# Patient Record
Sex: FEMALE | Race: WHITE | NOT HISPANIC OR LATINO | Employment: OTHER | URBAN - METROPOLITAN AREA
[De-identification: names, ages, dates, MRNs, and addresses within clinical notes are randomized per-mention and may not be internally consistent; named-entity substitution may affect disease eponyms.]

---

## 2017-03-23 ENCOUNTER — OFFICE VISIT (OUTPATIENT)
Dept: INTERNAL MEDICINE | Facility: CLINIC | Age: 67
End: 2017-03-23

## 2017-03-23 VITALS
WEIGHT: 165 LBS | BODY MASS INDEX: 28.17 KG/M2 | SYSTOLIC BLOOD PRESSURE: 150 MMHG | RESPIRATION RATE: 16 BRPM | OXYGEN SATURATION: 98 % | HEIGHT: 64 IN | HEART RATE: 91 BPM | DIASTOLIC BLOOD PRESSURE: 82 MMHG

## 2017-03-23 DIAGNOSIS — Z79.899 HIGH RISK MEDICATION USE: ICD-10-CM

## 2017-03-23 DIAGNOSIS — Z87.19 HISTORY OF MELENA: ICD-10-CM

## 2017-03-23 DIAGNOSIS — E78.2 MIXED HYPERLIPIDEMIA: Primary | ICD-10-CM

## 2017-03-23 DIAGNOSIS — Z23 NEED FOR DIPHTHERIA-TETANUS-PERTUSSIS (TDAP) VACCINE: ICD-10-CM

## 2017-03-23 LAB
ALBUMIN SERPL-MCNC: 4.8 G/DL (ref 3.5–5.2)
ALBUMIN/GLOB SERPL: 2.1 G/DL
ALP SERPL-CCNC: 71 U/L (ref 40–129)
ALT SERPL-CCNC: 17 U/L (ref 5–33)
AST SERPL-CCNC: 20 U/L (ref 5–32)
BASOPHILS # BLD AUTO: 0.03 10*3/MM3 (ref 0–0.2)
BASOPHILS NFR BLD AUTO: 0.5 % (ref 0–2)
BILIRUB SERPL-MCNC: 0.8 MG/DL (ref 0.2–1.2)
BUN SERPL-MCNC: 10 MG/DL (ref 8–23)
BUN/CREAT SERPL: 17.9 (ref 7–25)
CALCIUM SERPL-MCNC: 9.9 MG/DL (ref 8.8–10.5)
CHLORIDE SERPL-SCNC: 99 MMOL/L (ref 98–107)
CHOLEST SERPL-MCNC: 171 MG/DL (ref 0–200)
CHOLEST/HDLC SERPL: 3.89 {RATIO}
CO2 SERPL-SCNC: 27.3 MMOL/L (ref 22–29)
CREAT SERPL-MCNC: 0.56 MG/DL (ref 0.57–1)
EOSINOPHIL # BLD AUTO: 0.03 10*3/MM3 (ref 0.1–0.3)
EOSINOPHIL NFR BLD AUTO: 0.5 % (ref 0–4)
ERYTHROCYTE [DISTWIDTH] IN BLOOD BY AUTOMATED COUNT: 13.4 % (ref 11.5–14.5)
GLOBULIN SER CALC-MCNC: 2.3 GM/DL
GLUCOSE SERPL-MCNC: 88 MG/DL (ref 65–99)
HCT VFR BLD AUTO: 43.7 % (ref 37–47)
HDLC SERPL-MCNC: 44 MG/DL (ref 40–60)
HGB BLD-MCNC: 14.3 G/DL (ref 12–16)
IMM GRANULOCYTES # BLD: 0.01 10*3/MM3 (ref 0–0.03)
IMM GRANULOCYTES NFR BLD: 0.2 % (ref 0–0.5)
LDLC SERPL CALC-MCNC: 69 MG/DL (ref 0–100)
LYMPHOCYTES # BLD AUTO: 2.35 10*3/MM3 (ref 0.6–4.8)
LYMPHOCYTES NFR BLD AUTO: 36.4 % (ref 20–45)
MCH RBC QN AUTO: 30.6 PG (ref 27–31)
MCHC RBC AUTO-ENTMCNC: 32.7 G/DL (ref 31–37)
MCV RBC AUTO: 93.4 FL (ref 81–99)
MONOCYTES # BLD AUTO: 0.48 10*3/MM3 (ref 0–1)
MONOCYTES NFR BLD AUTO: 7.4 % (ref 3–8)
NEUTROPHILS # BLD AUTO: 3.55 10*3/MM3 (ref 1.5–8.3)
NEUTROPHILS NFR BLD AUTO: 55 % (ref 45–70)
NRBC BLD AUTO-RTO: 0 /100 WBC (ref 0–0)
PLATELET # BLD AUTO: 336 10*3/MM3 (ref 140–500)
POTASSIUM SERPL-SCNC: 4.4 MMOL/L (ref 3.5–5.2)
PROT SERPL-MCNC: 7.1 G/DL (ref 6–8.5)
RBC # BLD AUTO: 4.68 10*6/MM3 (ref 4.2–5.4)
SODIUM SERPL-SCNC: 139 MMOL/L (ref 136–145)
TRIGL SERPL-MCNC: 292 MG/DL (ref 0–150)
TSH SERPL DL<=0.005 MIU/L-ACNC: 1.05 MIU/ML (ref 0.27–4.2)
VLDLC SERPL CALC-MCNC: 58.4 MG/DL (ref 7–27)
WBC # BLD AUTO: 6.45 10*3/MM3 (ref 4.8–10.8)

## 2017-03-23 PROCEDURE — 99214 OFFICE O/P EST MOD 30 MIN: CPT | Performed by: INTERNAL MEDICINE

## 2017-03-23 RX ORDER — ROSUVASTATIN CALCIUM 10 MG/1
10 TABLET, COATED ORAL DAILY
Qty: 90 TABLET | Refills: 3 | Status: SHIPPED | OUTPATIENT
Start: 2017-03-23 | End: 2018-05-04 | Stop reason: SDUPTHER

## 2017-03-23 NOTE — PATIENT INSTRUCTIONS
Will refer to gastroenterology to evaluate for colonoscopy and esophagogastroduodenoscopy.    Will follow-up with your lab results.   Will plan on mammogram and DEXA in October.    Remember to schedule your dental and eye appointments this year.    Will follow-up with you for your physical.

## 2017-03-23 NOTE — PROGRESS NOTES
Subjective   Freda Castañeda is a 66 y.o. female.     History of Present Illness     The following portions of the patient's history were reviewed and updated as appropriate: allergies, current medications, past family history, past medical history, past social history, past surgical history and problem list.     Freda Castañeda is a 66 y.o. female who presents to [facility].  Needs rf's on crestor (90d, [facility]).      Past Medical History:   Diagnosis Date   • GERD (gastroesophageal reflux disease)    • Hyperlipidemia    • Need for diphtheria-tetanus-pertussis (Tdap) vaccine 3/23/2017   • Seasonal allergies    - arthritis     Current Outpatient Prescriptions on File Prior to Visit   Medication Sig   • acetaminophen (TYLENOL) 650 MG 8 hr tablet Take 2 tablets by mouth.   • ascorbic acid (VITAMIN C) 500 MG tablet Take 500 mg by mouth daily.   • ASPIRIN EC LO-DOSE PO Take 81 mg by mouth daily.   • Cholecalciferol (VITAMIN D3) 2000 UNITS tablet Take 2,000 Units by mouth daily.   • Cyanocobalamin (VITAMIN B 12 PO) Take  by mouth daily.   • diphenhydrAMINE (BENADRYL) 25 MG tablet Take 2 tablets by mouth at night as needed.   • docusate sodium (COLACE) 100 MG capsule Take 300 mg by mouth every night.   • loratadine (CLARITIN) 10 MG tablet Take  by mouth daily.   • mometasone (NASONEX) 50 MCG/ACT nasal spray into each nostril daily.   • Multiple Vitamins-Minerals (CENTRUM SILVER PO) Take  by mouth daily.   • Omega-3 Fatty Acids (FISH OIL) 1000 MG capsule capsule Take  by mouth daily.   • ranitidine (ZANTAC) 150 MG capsule Take 1 capsule by mouth 2 (two) times a day.   • vitamin E 100 UNIT capsule Take  by mouth.   • [DISCONTINUED] rosuvastatin (CRESTOR) 10 MG tablet    • [DISCONTINUED] HYDROcodone-acetaminophen (NORCO) 5-325 MG per tablet Take 1 tablet by mouth every 4 (four) hours as needed for moderate pain (4-6) for up to 15 doses.   • [DISCONTINUED] predniSONE (DELTASONE) 20 MG tablet Take 1 tablet by  mouth 2 (two) times a day.     No current facility-administered medications on file prior to visit.      Past Surgical History:   Procedure Laterality Date   • BURN TREATMENT     • CHOLECYSTECTOMY     • HERNIA REPAIR     • TUBAL ABDOMINAL LIGATION       Family History   Problem Relation Age of Onset   • Breast cancer Sister    - Niece with breast cancer s/p lumpectomy   - Sister with HTN  - Sister #2 with heart disease (MI @72, medically managed), COPD (2nd hand smoke)    SH:  - retired, was a finance assistance  - No TOB use  - ETOH (none)  - No illicits     ALL: NKA    HM:  - C-SCOPE (last was 12 years ago; diverticulosis); deferring currently but admits to sometimes having bloody-diarrhea (last time blood in stool was 2 weeks ago; has hemorrhoids, admits to black and tarry stools)  - Last pap: 2012 (no abnormalities; overdue)  - last mammogram: 2016 (no abnormalities)  - last dexa:unsure when (more than 2 years ago)  - Did not get flu shot this year  - Did not get PNA vaccine series; deferred  - Patient recalls having shingles vaccine in past  - Did not get Tdap in last 10 years    Review of Systems   Constitutional: Negative for activity change, appetite change and fever.   HENT: Negative for ear discharge, ear pain, postnasal drip, rhinorrhea and sore throat.    Eyes: Negative for pain and redness.   Respiratory: Negative for cough, chest tightness, shortness of breath and wheezing.    Cardiovascular: Negative for chest pain, palpitations and leg swelling.   Gastrointestinal: Negative for abdominal pain, blood in stool, constipation, diarrhea, nausea and vomiting.   Endocrine: Negative for cold intolerance and heat intolerance.   Genitourinary: Negative for dysuria, hematuria, vaginal bleeding and vaginal discharge.   Musculoskeletal: Positive for arthralgias. Negative for joint swelling.   Skin: Negative for rash and wound.   Hematological: Negative for adenopathy. Does not bruise/bleed easily.    Psychiatric/Behavioral: Negative for sleep disturbance and suicidal ideas.       Objective   Physical Exam   Constitutional: She is oriented to person, place, and time. She appears well-developed and well-nourished. No distress.   HENT:   Head: Normocephalic and atraumatic.   Right Ear: External ear normal.   Left Ear: External ear normal.   Mouth/Throat: Oropharynx is clear and moist. No oropharyngeal exudate.   Dried blood in right nare   Eyes: Conjunctivae and EOM are normal. Pupils are equal, round, and reactive to light. Right eye exhibits no discharge. Left eye exhibits no discharge. No scleral icterus.   Neck: Normal range of motion. Neck supple. No thyromegaly present.   Cardiovascular: Normal rate, regular rhythm and normal heart sounds.  Exam reveals no gallop and no friction rub.    No murmur heard.  Pulmonary/Chest: Effort normal and breath sounds normal. No respiratory distress. She has no wheezes. She has no rales. She exhibits no tenderness.   Abdominal: Soft. Bowel sounds are normal. She exhibits no distension and no mass. There is no tenderness. There is no rebound and no guarding. No hernia.   Musculoskeletal: Normal range of motion. She exhibits no tenderness.   Lymphadenopathy:     She has no cervical adenopathy.   Neurological: She is alert and oriented to person, place, and time. She exhibits normal muscle tone.   Skin: Skin is warm and dry. No rash noted. She is not diaphoretic.   Psychiatric: She has a normal mood and affect. Her behavior is normal.   Nursing note and vitals reviewed.      Assessment/Plan   Diagnoses and all orders for this visit:    Mixed hyperlipidemia  -     rosuvastatin (CRESTOR) 10 MG tablet; Take 1 tablet by mouth Daily.  -     Comprehensive metabolic panel  -     Lipid Panel w/ Chol/HDL Ratio  -     TSH  -     CBC w AUTO Differential    History of melena  -     Ambulatory Referral to Gastroenterology  -     CBC w AUTO Differential    High risk medication use    Need  for diphtheria-tetanus-pertussis (Tdap) vaccine      Freda Castañeda is a 66 y.o. female with PMH of GERD, HLD, and arthritis.      HLD:  - Recommend diet/exercise  - rf crestor     Allergies:  - Con't claritin  - Con't nasonex  - Add nasal saline to regimen for bloody nose     HM:  - Deferring PNA vaccine   - Will get vaccination records to assess when shingles vaccine was given  - Will give Tdap this visit as she has grandchildren   - Will schedule PAP on PE f/u  - Will check CBC, CMP, HbA1c, FLP, TSH, Vit D   - Will arrange DEXA scan and mammogram at the same time  - Pt deferring c-scope till the fall for her blood in stool; may need egd for melena.  Will refer to Dr. Westbrook for evaluation.      Gomez Andrews MD  Resident provider   PGY-3  IM/Ped    Patient seen and examined with resident physician - agree with assessment and plan. Tdap not covered so deferred today.   Refilled medication today.

## 2017-03-27 ENCOUNTER — TELEPHONE (OUTPATIENT)
Dept: INTERNAL MEDICINE | Facility: CLINIC | Age: 67
End: 2017-03-27

## 2017-03-27 NOTE — TELEPHONE ENCOUNTER
Patient v/u.    ----- Message from Krishan Robles MD sent at 3/23/2017  9:06 PM EDT -----  Labs look really good. Her trigycerides are very high, need fish oil ig twice daily or krill daiy.  The thyroid is good.

## 2017-11-06 DIAGNOSIS — Z00.00 HEALTHCARE MAINTENANCE: ICD-10-CM

## 2017-11-06 DIAGNOSIS — E78.5 HYPERLIPIDEMIA, UNSPECIFIED HYPERLIPIDEMIA TYPE: Primary | ICD-10-CM

## 2017-11-06 LAB
ALBUMIN SERPL-MCNC: 4.5 G/DL (ref 3.5–5.2)
ALBUMIN/GLOB SERPL: 2.1 G/DL
ALP SERPL-CCNC: 63 U/L (ref 40–129)
ALT SERPL-CCNC: 13 U/L (ref 5–33)
AST SERPL-CCNC: 17 U/L (ref 5–32)
BASOPHILS # BLD AUTO: 0.03 10*3/MM3 (ref 0–0.2)
BASOPHILS NFR BLD AUTO: 0.6 % (ref 0–2)
BILIRUB SERPL-MCNC: 0.9 MG/DL (ref 0.2–1.2)
BUN SERPL-MCNC: 8 MG/DL (ref 8–23)
BUN/CREAT SERPL: 16 (ref 7–25)
CALCIUM SERPL-MCNC: 9.5 MG/DL (ref 8.8–10.5)
CHLORIDE SERPL-SCNC: 103 MMOL/L (ref 98–107)
CHOLEST SERPL-MCNC: 162 MG/DL (ref 0–200)
CHOLEST/HDLC SERPL: 3.68 {RATIO}
CO2 SERPL-SCNC: 25.9 MMOL/L (ref 22–29)
CREAT SERPL-MCNC: 0.5 MG/DL (ref 0.57–1)
EOSINOPHIL # BLD AUTO: 0.04 10*3/MM3 (ref 0.1–0.3)
EOSINOPHIL NFR BLD AUTO: 0.8 % (ref 0–4)
ERYTHROCYTE [DISTWIDTH] IN BLOOD BY AUTOMATED COUNT: 13.4 % (ref 11.5–14.5)
GFR SERPLBLD CREATININE-BSD FMLA CKD-EPI: 123 ML/MIN/1.73
GFR SERPLBLD CREATININE-BSD FMLA CKD-EPI: 150 ML/MIN/1.73
GLOBULIN SER CALC-MCNC: 2.1 GM/DL
GLUCOSE SERPL-MCNC: 97 MG/DL (ref 65–99)
HCT VFR BLD AUTO: 40.6 % (ref 37–47)
HDLC SERPL-MCNC: 44 MG/DL (ref 40–60)
HGB BLD-MCNC: 13.7 G/DL (ref 12–16)
IMM GRANULOCYTES # BLD: 0.01 10*3/MM3 (ref 0–0.03)
IMM GRANULOCYTES NFR BLD: 0.2 % (ref 0–0.5)
LDLC SERPL CALC-MCNC: 79 MG/DL (ref 0–100)
LYMPHOCYTES # BLD AUTO: 1.89 10*3/MM3 (ref 0.6–4.8)
LYMPHOCYTES NFR BLD AUTO: 39.8 % (ref 20–45)
MCH RBC QN AUTO: 32 PG (ref 27–31)
MCHC RBC AUTO-ENTMCNC: 33.7 G/DL (ref 31–37)
MCV RBC AUTO: 94.9 FL (ref 81–99)
MONOCYTES # BLD AUTO: 0.47 10*3/MM3 (ref 0–1)
MONOCYTES NFR BLD AUTO: 9.9 % (ref 3–8)
NEUTROPHILS # BLD AUTO: 2.31 10*3/MM3 (ref 1.5–8.3)
NEUTROPHILS NFR BLD AUTO: 48.7 % (ref 45–70)
NRBC BLD AUTO-RTO: 0 /100 WBC (ref 0–0)
PLATELET # BLD AUTO: 295 10*3/MM3 (ref 140–500)
POTASSIUM SERPL-SCNC: 5 MMOL/L (ref 3.5–5.2)
PROT SERPL-MCNC: 6.6 G/DL (ref 6–8.5)
RBC # BLD AUTO: 4.28 10*6/MM3 (ref 4.2–5.4)
SODIUM SERPL-SCNC: 141 MMOL/L (ref 136–145)
TRIGL SERPL-MCNC: 195 MG/DL (ref 0–150)
TSH SERPL DL<=0.005 MIU/L-ACNC: 0.94 MIU/ML (ref 0.27–4.2)
VLDLC SERPL CALC-MCNC: 39 MG/DL (ref 7–27)
WBC # BLD AUTO: 4.75 10*3/MM3 (ref 4.8–10.8)

## 2017-11-14 ENCOUNTER — OFFICE VISIT (OUTPATIENT)
Dept: INTERNAL MEDICINE | Facility: CLINIC | Age: 67
End: 2017-11-14

## 2017-11-14 VITALS
DIASTOLIC BLOOD PRESSURE: 70 MMHG | WEIGHT: 164 LBS | OXYGEN SATURATION: 97 % | RESPIRATION RATE: 16 BRPM | HEIGHT: 64 IN | SYSTOLIC BLOOD PRESSURE: 136 MMHG | HEART RATE: 97 BPM | BODY MASS INDEX: 28 KG/M2

## 2017-11-14 DIAGNOSIS — Z12.31 ENCOUNTER FOR SCREENING MAMMOGRAM FOR MALIGNANT NEOPLASM OF BREAST: ICD-10-CM

## 2017-11-14 DIAGNOSIS — E78.2 MIXED HYPERLIPIDEMIA: Primary | ICD-10-CM

## 2017-11-14 DIAGNOSIS — Z23 NEED FOR PNEUMOCOCCAL VACCINATION: ICD-10-CM

## 2017-11-14 DIAGNOSIS — M19.90 ARTHRITIS: ICD-10-CM

## 2017-11-14 DIAGNOSIS — K21.9 GASTROESOPHAGEAL REFLUX DISEASE, ESOPHAGITIS PRESENCE NOT SPECIFIED: ICD-10-CM

## 2017-11-14 DIAGNOSIS — Z78.0 MENOPAUSE: ICD-10-CM

## 2017-11-14 DIAGNOSIS — R19.7 DIARRHEA, UNSPECIFIED TYPE: ICD-10-CM

## 2017-11-14 DIAGNOSIS — Z12.4 PAP SMEAR FOR CERVICAL CANCER SCREENING: ICD-10-CM

## 2017-11-14 PROCEDURE — 90670 PCV13 VACCINE IM: CPT | Performed by: INTERNAL MEDICINE

## 2017-11-14 PROCEDURE — G0009 ADMIN PNEUMOCOCCAL VACCINE: HCPCS | Performed by: INTERNAL MEDICINE

## 2017-11-14 PROCEDURE — G0438 PPPS, INITIAL VISIT: HCPCS | Performed by: INTERNAL MEDICINE

## 2017-11-14 PROCEDURE — 99213 OFFICE O/P EST LOW 20 MIN: CPT | Performed by: INTERNAL MEDICINE

## 2017-11-14 NOTE — PROGRESS NOTES
QUICK REFERENCE INFORMATION:  The ABCs of the Annual Wellness Visit    Initial Medicare Wellness Visit    HEALTH RISK ASSESSMENT    1950    Recent Hospitalizations:  No hospitalization(s) within the last year..        Current Medical Providers:  Patient Care Team:  Haresh Westbrook MD as PCP - General (Gastroenterology)  Krishan Robles MD as PCP - Claims Attributed      Smoking Status:  History   Smoking Status   • Never Smoker   Smokeless Tobacco   • Not on file       Alcohol Consumption:  History   Alcohol Use No       Depression Screen:   PHQ-2/PHQ-9 Depression Screening 11/14/2017   Little interest or pleasure in doing things 0   Feeling down, depressed, or hopeless 0   Total Score 0       Health Habits and Functional and Cognitive Screening:  Functional & Cognitive Status 11/14/2017   Do you have difficulty preparing food and eating? No   Do you have difficulty bathing yourself, getting dressed or grooming yourself? No   Do you have difficulty using the toilet? No   Do you have difficulty moving around from place to place? No   Do you have trouble with steps or getting out of a bed or a chair? No   In the past year have you fallen or experienced a near fall? No   Do you need help using the phone?  No   Are you deaf or do you have serious difficulty hearing?  No   Do you need help with transportation? No   Do you need help shopping? No   Do you need help preparing meals?  No   Do you need help with housework?  No   Do you need help with laundry? No   Do you need help taking your medications? No   Do you need help managing money? No   Do you have difficulty concentrating, remembering or making decisions? No           Does the patient have evidence of cognitive impairment? No    Asiprin use counseling: Taking ASA appropriately as indicated      Recent Lab Results:    Visual Acuity:  No exam data present    Age-appropriate Screening Schedule:  Refer to the list below for future screening  recommendations based on patient's age, sex and/or medical conditions. Orders for these recommended tests are listed in the plan section. The patient has been provided with a written plan.    Health Maintenance   Topic Date Due   • COLONOSCOPY  07/05/2016   • PNEUMOCOCCAL VACCINES (65+ LOW/MEDIUM RISK) (1 of 2 - PCV13) 10/12/2016   • MAMMOGRAM  10/14/2018   • LIPID PANEL  11/06/2018   • TDAP/TD VACCINES (2 - Td) 03/23/2027   • INFLUENZA VACCINE  Addressed   • ZOSTER VACCINE  Addressed        Subjective   History of Present Illness    Freda Castañeda is a 67 y.o. female who presents for an Annual Wellness Visit.    Eating well with some home cooked meals.  Protein drink in the morning . No sugar sweetened drinks.  Some sweets and a chocaholic. Trying to walk every day about 45 min to 1 hour.  Minimal sweating with this time. Some breads and potatoes.      She is very tearful today about her grandson, son of her son, who is reportedly transgender.  She has not seen her family since April. They moved to [place] last April. She is very upset about the whole situation and just wants a relationship with her grandchildren.  She is [employer]. She reports discord.       She is having intermittent non painful scant red blood per rectum. NO pain with defecation.  Did have prior colonoscopy but overdue.  She has history of normal colonoscopy. NO change in bowel patterns.      She does think she may have more constipation with generic crestor.  She is hoping to transition back.       The following portions of the patient's history were reviewed and updated as appropriate: allergies, current medications, past family history, past medical history, past social history, past surgical history and problem list.    Outpatient Medications Prior to Visit   Medication Sig Dispense Refill   • acetaminophen (TYLENOL) 650 MG 8 hr tablet Take 2 tablets by mouth.     • ascorbic acid (VITAMIN C) 500 MG tablet Take 500 mg by mouth daily.     •  ASPIRIN EC LO-DOSE PO Take 81 mg by mouth daily.     • Cholecalciferol (VITAMIN D3) 2000 UNITS tablet Take 2,000 Units by mouth daily.     • Cyanocobalamin (VITAMIN B 12 PO) Take  by mouth daily.     • diphenhydrAMINE (BENADRYL) 25 MG tablet Take 2 tablets by mouth at night as needed.     • docusate sodium (COLACE) 100 MG capsule Take 300 mg by mouth every night.     • loratadine (CLARITIN) 10 MG tablet Take  by mouth daily.     • mometasone (NASONEX) 50 MCG/ACT nasal spray into each nostril daily.     • Multiple Vitamins-Minerals (CENTRUM SILVER PO) Take  by mouth daily.     • Omega-3 Fatty Acids (FISH OIL) 1000 MG capsule capsule Take  by mouth daily.     • ranitidine (ZANTAC) 150 MG capsule Take 1 capsule by mouth 2 (two) times a day. 30 capsule 0   • rosuvastatin (CRESTOR) 10 MG tablet Take 1 tablet by mouth Daily. 90 tablet 3   • vitamin E 100 UNIT capsule Take  by mouth.       No facility-administered medications prior to visit.        Patient Active Problem List   Diagnosis   • Hay fever   • Arthritis   • Hematochezia   • Diarrhea   • Gastroesophageal reflux disease   • Hyperlipidemia   • Polyarteritis nodosa   • Hordeolum externum   • Arthritis of temporomandibular joint   • Urinary tract infection   • Need for diphtheria-tetanus-pertussis (Tdap) vaccine   • Menopause       Advance Care Planning:  has NO advance directive - not interested in additional information    Identification of Risk Factors:  Risk factors include: weight  and unhealthy diet.    Review of Systems   Constitutional: Negative.  Negative for fatigue and fever.   HENT: Negative.    Respiratory: Negative.    Cardiovascular: Negative.    Gastrointestinal: Negative.    Musculoskeletal: Negative.    Neurological: Negative.  Negative for tremors, speech difficulty and headaches.   Psychiatric/Behavioral: Positive for sleep disturbance. The patient is nervous/anxious.        Compared to one year ago, the patient feels her physical health is the  "same.  Compared to one year ago, the patient feels her mental health is the same.    Objective     Physical Exam   Constitutional: She is oriented to person, place, and time. She appears well-developed and well-nourished.   HENT:   Head: Normocephalic and atraumatic.   Right Ear: External ear normal.   Left Ear: External ear normal.   Eyes: EOM are normal. Pupils are equal, round, and reactive to light.   Neck: Normal range of motion. Neck supple. No thyromegaly present.   Cardiovascular: Normal rate, regular rhythm and normal heart sounds.    No murmur heard.  Pulmonary/Chest: Effort normal and breath sounds normal.   Musculoskeletal: She exhibits no edema.   Neurological: She is alert and oriented to person, place, and time.   Skin: Skin is warm and dry.   Psychiatric: She has a normal mood and affect. Her behavior is normal.   Vitals reviewed.      Vitals:    11/14/17 1351   BP: 136/70   Pulse: 97   Resp: 16   SpO2: 97%   Weight: 164 lb (74.4 kg)   Height: 64\" (162.6 cm)       Body mass index is 28.15 kg/(m^2).  Discussed the patient's BMI with her. The BMI is above average; BMI management plan is completed.    Assessment/Plan   Patient Self-Management and Personalized Health Advice  The patient has been provided with information about: diet, exercise and weight management and preventive services including:   ·  is NOK.    Visit Diagnoses:    ICD-10-CM ICD-9-CM   1. Mixed hyperlipidemia E78.2 272.2   2. Diarrhea, unspecified type R19.7 787.91   3. Gastroesophageal reflux disease, esophagitis presence not specified K21.9 530.81   4. Arthritis M19.90 716.90   5. Pap smear for cervical cancer screening Z12.4 V76.2   6. Menopause Z78.0 627.2   7. Encounter for screening mammogram for malignant neoplasm of breast  Z12.31 V76.12       Orders Placed This Encounter   Procedures   • Mammo Screening Bilateral With CAD     Standing Status:   Future     Standing Expiration Date:   11/14/2018     Order Specific " Question:   Reason for Exam:     Answer:   menopause   • DEXA Bone Density Axial     Order Specific Question:   Reason for Exam:     Answer:   menopause       Outpatient Encounter Prescriptions as of 11/14/2017   Medication Sig Dispense Refill   • acetaminophen (TYLENOL) 650 MG 8 hr tablet Take 2 tablets by mouth.     • ascorbic acid (VITAMIN C) 500 MG tablet Take 500 mg by mouth daily.     • ASPIRIN EC LO-DOSE PO Take 81 mg by mouth daily.     • Cholecalciferol (VITAMIN D3) 2000 UNITS tablet Take 2,000 Units by mouth daily.     • Cyanocobalamin (VITAMIN B 12 PO) Take  by mouth daily.     • diphenhydrAMINE (BENADRYL) 25 MG tablet Take 2 tablets by mouth at night as needed.     • docusate sodium (COLACE) 100 MG capsule Take 300 mg by mouth every night.     • loratadine (CLARITIN) 10 MG tablet Take  by mouth daily.     • mometasone (NASONEX) 50 MCG/ACT nasal spray into each nostril daily.     • Multiple Vitamins-Minerals (CENTRUM SILVER PO) Take  by mouth daily.     • Omega-3 Fatty Acids (FISH OIL) 1000 MG capsule capsule Take  by mouth daily.     • ranitidine (ZANTAC) 150 MG capsule Take 1 capsule by mouth 2 (two) times a day. 30 capsule 0   • rosuvastatin (CRESTOR) 10 MG tablet Take 1 tablet by mouth Daily. 90 tablet 3   • vitamin E 100 UNIT capsule Take  by mouth.       No facility-administered encounter medications on file as of 11/14/2017.        Reviewed use of high risk medication in the elderly: yes  Reviewed for potential of harmful drug interactions in the elderly: yes    Follow Up:  Return in about 6 months (around 5/14/2018).     An After Visit Summary and PPPS with all of these plans were given to the patient.      HCM  - Prevnar today  - TDAP at last visit  - Shingles already given previously  - Will schedule DEXA and Mammogram   - Will need PAP at next appointment.      Arthralgias  - Will refill diclofenac PRN for pain  - Already taking tylenol arthritis strength.     Hematochezia  - Periodic  hematochezia and last C scope aprox 13 years ago  - Will re-schedule appointment with GI for C scope  - Denies melena today.  Not on NSAIDS.  Likely will not need EGD as only taking ranitidine PRN.     HLD  - Will transition back to Crestor (from rosuvastatin with calcium) due to constipation  - Lipid panel well controlled.       Jose Rouse MD  Med/Peds PGY-3         Patient seen and examined with resident physician, agree with assessment and plan. She was very tearful, offered SSRI therapy or otc 5htp, but she declines.    Spent 25 min in discussion with her. Recommended counseling due to family discord that is definitely adversely affecting her quality of life.

## 2017-12-14 ENCOUNTER — HOSPITAL ENCOUNTER (OUTPATIENT)
Dept: MAMMOGRAPHY | Facility: HOSPITAL | Age: 67
Discharge: HOME | End: 2017-12-14
Attending: INTERNAL MEDICINE | Admitting: INTERNAL MEDICINE

## 2017-12-14 ENCOUNTER — APPOINTMENT (OUTPATIENT)
Dept: BONE DENSITY | Facility: HOSPITAL | Age: 67
End: 2017-12-14
Attending: INTERNAL MEDICINE

## 2017-12-14 DIAGNOSIS — Z12.31 ENCOUNTER FOR SCREENING MAMMOGRAM FOR MALIGNANT NEOPLASM OF BREAST: ICD-10-CM

## 2017-12-14 DIAGNOSIS — Z78.0 MENOPAUSE: ICD-10-CM

## 2017-12-14 PROCEDURE — 77063 BREAST TOMOSYNTHESIS BI: CPT

## 2017-12-14 PROCEDURE — 77080 DXA BONE DENSITY AXIAL: CPT

## 2017-12-14 PROCEDURE — G0202 SCR MAMMO BI INCL CAD: HCPCS

## 2017-12-18 ENCOUNTER — TELEPHONE (OUTPATIENT)
Dept: INTERNAL MEDICINE | Facility: CLINIC | Age: 67
End: 2017-12-18

## 2017-12-18 NOTE — TELEPHONE ENCOUNTER
Patient is aware        ----- Message from Krishan Robles MD sent at 12/14/2017  4:53 PM EST -----  Mild osteopenia, calcium and vitamin D

## 2017-12-18 NOTE — TELEPHONE ENCOUNTER
Patient is aware      ----- Message from Krishan Robles MD sent at 12/14/2017 12:13 PM EST -----  Mammogram negative

## 2018-05-04 DIAGNOSIS — E78.2 MIXED HYPERLIPIDEMIA: ICD-10-CM

## 2018-05-04 RX ORDER — ROSUVASTATIN CALCIUM 10 MG/1
10 TABLET, COATED ORAL DAILY
Qty: 90 TABLET | Refills: 1 | Status: SHIPPED | OUTPATIENT
Start: 2018-05-04 | End: 2018-10-30 | Stop reason: SDUPTHER

## 2018-09-06 ENCOUNTER — OFFICE VISIT (OUTPATIENT)
Dept: GASTROENTEROLOGY | Facility: CLINIC | Age: 68
End: 2018-09-06

## 2018-09-06 VITALS
HEIGHT: 64 IN | SYSTOLIC BLOOD PRESSURE: 118 MMHG | WEIGHT: 162.2 LBS | BODY MASS INDEX: 27.69 KG/M2 | DIASTOLIC BLOOD PRESSURE: 78 MMHG

## 2018-09-06 DIAGNOSIS — Z12.11 SCREENING FOR COLON CANCER: ICD-10-CM

## 2018-09-06 DIAGNOSIS — R10.84 GENERALIZED ABDOMINAL PAIN: Primary | ICD-10-CM

## 2018-09-06 DIAGNOSIS — K21.9 GASTROESOPHAGEAL REFLUX DISEASE, ESOPHAGITIS PRESENCE NOT SPECIFIED: ICD-10-CM

## 2018-09-06 DIAGNOSIS — K92.1 HEMATOCHEZIA: ICD-10-CM

## 2018-09-06 PROCEDURE — 99214 OFFICE O/P EST MOD 30 MIN: CPT | Performed by: INTERNAL MEDICINE

## 2018-09-06 NOTE — PROGRESS NOTES
PATIENT INFORMATION  Freda Castañeda       - 1950    CHIEF COMPLAINT  Chief Complaint   Patient presents with   • Constipation   • Diarrhea   • Heartburn       HISTORY OF PRESENT ILLNESS  Is 14 years past her last screening but has had loose BMs that are worst but is moving bowels daily and has developed worseing Hemorrhoids awith intermittant BRBPR and no Anemia and that was a year ago. No direct family history oand her last 2 colonoscopy were normal  Also goes on to describe heartburn unresponsive to tagamet and has tried several different OTC meds. No meatr impaction nor dysphagia. NOcturnal chest burning, and has had an ER visit for CP. W/U was negative in 2016. Also has had overal less constipation since her GB was out 6 years ago              REVIEW OF SYSTEMS  Review of Systems   Constitutional: Positive for fatigue.   Gastrointestinal: Positive for abdominal pain, constipation, diarrhea, nausea and rectal pain.        Reflux   All other systems reviewed and are negative.        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]   • Urinary tract infection [N39.0]         PAST MEDICAL HISTORY  Past Medical History:   Diagnosis Date   • GERD (gastroesophageal reflux disease)    • Hyperlipidemia    • Menopause 2017   • Need for diphtheria-tetanus-pertussis (Tdap) vaccine 3/23/2017   • Pap smear for cervical cancer screening 2017   • Seasonal allergies          SURGICAL HISTORY  Past Surgical History:   Procedure Laterality Date   • BURN TREATMENT     • CHOLECYSTECTOMY     • COLONOSCOPY     • HERNIA REPAIR     •  TUBAL ABDOMINAL LIGATION           FAMILY HISTORY  Family History   Problem Relation Age of Onset   • Breast cancer Sister    • Breast cancer Other    • Colon polyps Neg Hx    • Crohn's disease Neg Hx          SOCIAL HISTORY  Social History     Occupational History   • Not on file.     Social History Main Topics   • Smoking status: Never Smoker   • Smokeless tobacco: Not on file   • Alcohol use No   • Drug use: No   • Sexual activity: Yes         CURRENT MEDICATIONS    Current Outpatient Prescriptions:   •  acetaminophen (TYLENOL) 650 MG 8 hr tablet, Take 2 tablets by mouth., Disp: , Rfl:   •  ascorbic acid (VITAMIN C) 500 MG tablet, Take 500 mg by mouth daily., Disp: , Rfl:   •  ASPIRIN EC LO-DOSE PO, Take 81 mg by mouth daily., Disp: , Rfl:   •  Cholecalciferol (VITAMIN D3) 2000 UNITS tablet, Take 2,000 Units by mouth daily., Disp: , Rfl:   •  Cyanocobalamin (VITAMIN B 12 PO), Take  by mouth daily., Disp: , Rfl:   •  diphenhydrAMINE (BENADRYL) 25 MG tablet, Take 2 tablets by mouth at night as needed., Disp: , Rfl:   •  docusate sodium (COLACE) 100 MG capsule, Take 300 mg by mouth every night., Disp: , Rfl:   •  loratadine (CLARITIN) 10 MG tablet, Take  by mouth daily., Disp: , Rfl:   •  mometasone (NASONEX) 50 MCG/ACT nasal spray, into each nostril daily., Disp: , Rfl:   •  Multiple Vitamins-Minerals (CENTRUM SILVER PO), Take  by mouth daily., Disp: , Rfl:   •  Omega-3 Fatty Acids (FISH OIL) 1000 MG capsule capsule, Take  by mouth daily., Disp: , Rfl:   •  ranitidine (ZANTAC) 150 MG capsule, Take 1 capsule by mouth 2 (two) times a day., Disp: 30 capsule, Rfl: 0  •  rosuvastatin (CRESTOR) 10 MG tablet, Take 1 tablet by mouth Daily., Disp: 90 tablet, Rfl: 1  •  sodium phosphates (OSMOPREP) 1.102-0.398 g tablet tablet, Starting at 3:00pm -4 tabs every 15 min for 5 doses (20 tabs), second dosing starting at 11:00pm 4 tabs every 15 min. For 2 doses (8 tabs), Disp: 28 tablet, Rfl: 0  •  vitamin E 100 UNIT capsule, Take   "by mouth., Disp: , Rfl:     ALLERGIES  Other    VITALS  Vitals:    09/06/18 1126   BP: 118/78   Weight: 73.6 kg (162 lb 3.2 oz)   Height: 162.6 cm (64.02\")       LAST RESULTS   Orders Only on 11/06/2017   Component Date Value Ref Range Status   • WBC 11/06/2017 4.75* 4.80 - 10.80 10*3/mm3 Final   • RBC 11/06/2017 4.28  4.20 - 5.40 10*6/mm3 Final   • Hemoglobin 11/06/2017 13.7  12.0 - 16.0 g/dL Final   • Hematocrit 11/06/2017 40.6  37.0 - 47.0 % Final   • MCV 11/06/2017 94.9  81.0 - 99.0 fL Final   • MCH 11/06/2017 32.0* 27.0 - 31.0 pg Final   • MCHC 11/06/2017 33.7  31.0 - 37.0 g/dL Final   • RDW 11/06/2017 13.4  11.5 - 14.5 % Final   • Platelets 11/06/2017 295  140 - 500 10*3/mm3 Final   • Neutrophil Rel % 11/06/2017 48.7  45.0 - 70.0 % Final   • Lymphocyte Rel % 11/06/2017 39.8  20.0 - 45.0 % Final   • Monocyte Rel % 11/06/2017 9.9* 3.0 - 8.0 % Final   • Eosinophil Rel % 11/06/2017 0.8  0.0 - 4.0 % Final   • Basophil Rel % 11/06/2017 0.6  0.0 - 2.0 % Final   • Neutrophils Absolute 11/06/2017 2.31  1.50 - 8.30 10*3/mm3 Final   • Lymphocytes Absolute 11/06/2017 1.89  0.60 - 4.80 10*3/mm3 Final   • Monocytes Absolute 11/06/2017 0.47  0.00 - 1.00 10*3/mm3 Final   • Eosinophils Absolute 11/06/2017 0.04* 0.10 - 0.30 10*3/mm3 Final   • Basophils Absolute 11/06/2017 0.03  0.00 - 0.20 10*3/mm3 Final   • Immature Granulocyte Rel % 11/06/2017 0.2  0.0 - 0.5 % Final   • Immature Grans Absolute 11/06/2017 0.01  0.00 - 0.03 10*3/mm3 Final   • nRBC 11/06/2017 0.0  0.0 - 0.0 /100 WBC Final   • Glucose 11/06/2017 97  65 - 99 mg/dL Final   • BUN 11/06/2017 8  8 - 23 mg/dL Final   • Creatinine 11/06/2017 0.50* 0.57 - 1.00 mg/dL Final   • eGFR Non African Am 11/06/2017 123  >60 mL/min/1.73 Final   • eGFR African Am 11/06/2017 150  >60 mL/min/1.73 Final   • BUN/Creatinine Ratio 11/06/2017 16.0  7.0 - 25.0 Final   • Sodium 11/06/2017 141  136 - 145 mmol/L Final   • Potassium 11/06/2017 5.0  3.5 - 5.2 mmol/L Final   • Chloride " 11/06/2017 103  98 - 107 mmol/L Final   • Total CO2 11/06/2017 25.9  22.0 - 29.0 mmol/L Final   • Calcium 11/06/2017 9.5  8.8 - 10.5 mg/dL Final   • Total Protein 11/06/2017 6.6  6.0 - 8.5 g/dL Final   • Albumin 11/06/2017 4.50  3.50 - 5.20 g/dL Final   • Globulin 11/06/2017 2.1  gm/dL Final   • A/G Ratio 11/06/2017 2.1  g/dL Final   • Total Bilirubin 11/06/2017 0.9  0.2 - 1.2 mg/dL Final   • Alkaline Phosphatase 11/06/2017 63  40 - 129 U/L Final   • AST (SGOT) 11/06/2017 17  5 - 32 U/L Final   • ALT (SGPT) 11/06/2017 13  5 - 33 U/L Final   • Total Cholesterol 11/06/2017 162  0 - 200 mg/dL Final   • Triglycerides 11/06/2017 195* 0 - 150 mg/dL Final   • HDL Cholesterol 11/06/2017 44  40 - 60 mg/dL Final   • VLDL Cholesterol 11/06/2017 39* 7 - 27 mg/dL Final   • LDL Cholesterol  11/06/2017 79  0 - 100 mg/dL Final   • Chol/HDL Ratio 11/06/2017 3.68   Final   • TSH 11/06/2017 0.935  0.270 - 4.200 mIU/mL Final     No results found.    PHYSICAL EXAM  Physical Exam   Constitutional: She is oriented to person, place, and time. She appears well-developed and well-nourished.   HENT:   Head: Normocephalic and atraumatic.   Eyes: Pupils are equal, round, and reactive to light. Conjunctivae and EOM are normal. No scleral icterus.   Neck: Normal range of motion. Neck supple. No thyromegaly present.   Cardiovascular: Normal rate, regular rhythm, normal heart sounds and intact distal pulses.  Exam reveals no gallop.    No murmur heard.  Pulmonary/Chest: Effort normal and breath sounds normal. She has no wheezes. She has no rales.   Abdominal: Soft. Bowel sounds are normal. She exhibits no shifting dullness, no distension, no fluid wave, no abdominal bruit, no ascites and no mass. There is no hepatosplenomegaly. There is tenderness in the right upper quadrant, right lower quadrant, epigastric area, periumbilical area, left upper quadrant and left lower quadrant. There is no guarding and negative Ewing's sign. Hernia confirmed  negative in the ventral area.   Musculoskeletal: Normal range of motion. She exhibits no edema.   Lymphadenopathy:     She has no cervical adenopathy.   Neurological: She is alert and oriented to person, place, and time.   Skin: Skin is warm and dry. No rash noted. She is not diaphoretic. No erythema.   Psychiatric: She has a normal mood and affect. Her behavior is normal.       ASSESSMENT  Diagnoses and all orders for this visit:    Generalized abdominal pain    Gastroesophageal reflux disease, esophagitis presence not specified  -     Case Request; Standing  -     Case Request    Hematochezia    Screening for colon cancer  -     Case Request; Standing  -     Case Request    Other orders  -     Follow Anesthesia Guidelines / Standing Orders; Future  -     Obtain Informed Consent; Standing  -     sodium phosphates (OSMOPREP) 1.102-0.398 g tablet tablet; Starting at 3:00pm -4 tabs every 15 min for 5 doses (20 tabs), second dosing starting at 11:00pm 4 tabs every 15 min. For 2 doses (8 tabs)          PLAN  Return in about 3 months (around 12/6/2018).

## 2018-10-30 DIAGNOSIS — E78.2 MIXED HYPERLIPIDEMIA: ICD-10-CM

## 2018-10-30 RX ORDER — ROSUVASTATIN CALCIUM 10 MG/1
TABLET, COATED ORAL
Qty: 90 TABLET | Refills: 1 | Status: SHIPPED | OUTPATIENT
Start: 2018-10-30 | End: 2019-04-29 | Stop reason: SDUPTHER

## 2018-11-02 ENCOUNTER — HOSPITAL ENCOUNTER (OUTPATIENT)
Facility: HOSPITAL | Age: 68
Setting detail: HOSPITAL OUTPATIENT SURGERY
Discharge: HOME | End: 2018-11-02
Attending: INTERNAL MEDICINE | Admitting: INTERNAL MEDICINE

## 2018-11-02 ENCOUNTER — ANESTHESIA EVENT (OUTPATIENT)
Dept: GASTROENTEROLOGY | Facility: HOSPITAL | Age: 68
End: 2018-11-02

## 2018-11-02 ENCOUNTER — ANESTHESIA (OUTPATIENT)
Dept: GASTROENTEROLOGY | Facility: HOSPITAL | Age: 68
End: 2018-11-02

## 2018-11-02 VITALS
TEMPERATURE: 97.8 F | HEIGHT: 64 IN | HEART RATE: 85 BPM | OXYGEN SATURATION: 98 % | DIASTOLIC BLOOD PRESSURE: 63 MMHG | RESPIRATION RATE: 16 BRPM | BODY MASS INDEX: 27.33 KG/M2 | SYSTOLIC BLOOD PRESSURE: 121 MMHG | WEIGHT: 160.06 LBS

## 2018-11-02 DIAGNOSIS — K21.9 GASTROESOPHAGEAL REFLUX DISEASE, ESOPHAGITIS PRESENCE NOT SPECIFIED: ICD-10-CM

## 2018-11-02 DIAGNOSIS — Z12.11 SCREENING FOR COLON CANCER: ICD-10-CM

## 2018-11-02 PROCEDURE — 88313 SPECIAL STAINS GROUP 2: CPT | Performed by: INTERNAL MEDICINE

## 2018-11-02 PROCEDURE — 45380 COLONOSCOPY AND BIOPSY: CPT | Performed by: INTERNAL MEDICINE

## 2018-11-02 PROCEDURE — 88313 SPECIAL STAINS GROUP 2: CPT

## 2018-11-02 PROCEDURE — 25010000002 PROPOFOL 10 MG/ML EMULSION: Performed by: ANESTHESIOLOGY

## 2018-11-02 PROCEDURE — 88305 TISSUE EXAM BY PATHOLOGIST: CPT | Performed by: INTERNAL MEDICINE

## 2018-11-02 PROCEDURE — 43239 EGD BIOPSY SINGLE/MULTIPLE: CPT | Performed by: INTERNAL MEDICINE

## 2018-11-02 RX ORDER — LABETALOL HYDROCHLORIDE 5 MG/ML
5 INJECTION, SOLUTION INTRAVENOUS
Status: DISCONTINUED | OUTPATIENT
Start: 2018-11-02 | End: 2018-11-02 | Stop reason: HOSPADM

## 2018-11-02 RX ORDER — PROMETHAZINE HYDROCHLORIDE 12.5 MG/1
12.5 TABLET ORAL ONCE AS NEEDED
Status: DISCONTINUED | OUTPATIENT
Start: 2018-11-02 | End: 2018-11-02 | Stop reason: HOSPADM

## 2018-11-02 RX ORDER — HYDROCODONE BITARTRATE AND ACETAMINOPHEN 7.5; 325 MG/1; MG/1
1 TABLET ORAL ONCE AS NEEDED
Status: DISCONTINUED | OUTPATIENT
Start: 2018-11-02 | End: 2018-11-02 | Stop reason: HOSPADM

## 2018-11-02 RX ORDER — FENTANYL CITRATE 50 UG/ML
50 INJECTION, SOLUTION INTRAMUSCULAR; INTRAVENOUS
Status: DISCONTINUED | OUTPATIENT
Start: 2018-11-02 | End: 2018-11-02 | Stop reason: HOSPADM

## 2018-11-02 RX ORDER — NALOXONE HCL 0.4 MG/ML
0.2 VIAL (ML) INJECTION AS NEEDED
Status: DISCONTINUED | OUTPATIENT
Start: 2018-11-02 | End: 2018-11-02 | Stop reason: HOSPADM

## 2018-11-02 RX ORDER — PROMETHAZINE HYDROCHLORIDE 25 MG/ML
12.5 INJECTION, SOLUTION INTRAMUSCULAR; INTRAVENOUS ONCE AS NEEDED
Status: DISCONTINUED | OUTPATIENT
Start: 2018-11-02 | End: 2018-11-02 | Stop reason: HOSPADM

## 2018-11-02 RX ORDER — DIPHENHYDRAMINE HCL 25 MG
25 CAPSULE ORAL EVERY 6 HOURS PRN
Status: DISCONTINUED | OUTPATIENT
Start: 2018-11-02 | End: 2018-11-02 | Stop reason: HOSPADM

## 2018-11-02 RX ORDER — PROPOFOL 10 MG/ML
VIAL (ML) INTRAVENOUS CONTINUOUS PRN
Status: DISCONTINUED | OUTPATIENT
Start: 2018-11-02 | End: 2018-11-02 | Stop reason: SURG

## 2018-11-02 RX ORDER — PROMETHAZINE HYDROCHLORIDE 25 MG/1
25 SUPPOSITORY RECTAL ONCE AS NEEDED
Status: DISCONTINUED | OUTPATIENT
Start: 2018-11-02 | End: 2018-11-02 | Stop reason: HOSPADM

## 2018-11-02 RX ORDER — HYDROMORPHONE HCL 110MG/55ML
0.5 PATIENT CONTROLLED ANALGESIA SYRINGE INTRAVENOUS
Status: DISCONTINUED | OUTPATIENT
Start: 2018-11-02 | End: 2018-11-02 | Stop reason: HOSPADM

## 2018-11-02 RX ORDER — OXYCODONE AND ACETAMINOPHEN 7.5; 325 MG/1; MG/1
1 TABLET ORAL ONCE AS NEEDED
Status: DISCONTINUED | OUTPATIENT
Start: 2018-11-02 | End: 2018-11-02 | Stop reason: HOSPADM

## 2018-11-02 RX ORDER — SODIUM CHLORIDE, SODIUM LACTATE, POTASSIUM CHLORIDE, CALCIUM CHLORIDE 600; 310; 30; 20 MG/100ML; MG/100ML; MG/100ML; MG/100ML
1000 INJECTION, SOLUTION INTRAVENOUS CONTINUOUS
Status: DISCONTINUED | OUTPATIENT
Start: 2018-11-02 | End: 2018-11-02 | Stop reason: HOSPADM

## 2018-11-02 RX ORDER — PROMETHAZINE HYDROCHLORIDE 25 MG/1
25 TABLET ORAL ONCE AS NEEDED
Status: DISCONTINUED | OUTPATIENT
Start: 2018-11-02 | End: 2018-11-02 | Stop reason: HOSPADM

## 2018-11-02 RX ORDER — OMEPRAZOLE 40 MG/1
40 CAPSULE, DELAYED RELEASE ORAL DAILY
Qty: 90 CAPSULE | Refills: 3 | Status: SHIPPED | OUTPATIENT
Start: 2018-11-02 | End: 2018-11-15 | Stop reason: SDUPTHER

## 2018-11-02 RX ORDER — FLUMAZENIL 0.1 MG/ML
0.2 INJECTION INTRAVENOUS AS NEEDED
Status: DISCONTINUED | OUTPATIENT
Start: 2018-11-02 | End: 2018-11-02 | Stop reason: HOSPADM

## 2018-11-02 RX ORDER — EPHEDRINE SULFATE 50 MG/ML
5 INJECTION, SOLUTION INTRAVENOUS ONCE AS NEEDED
Status: DISCONTINUED | OUTPATIENT
Start: 2018-11-02 | End: 2018-11-02 | Stop reason: HOSPADM

## 2018-11-02 RX ORDER — LIDOCAINE HYDROCHLORIDE 20 MG/ML
INJECTION, SOLUTION INFILTRATION; PERINEURAL AS NEEDED
Status: DISCONTINUED | OUTPATIENT
Start: 2018-11-02 | End: 2018-11-02 | Stop reason: SURG

## 2018-11-02 RX ORDER — PROPOFOL 10 MG/ML
VIAL (ML) INTRAVENOUS AS NEEDED
Status: DISCONTINUED | OUTPATIENT
Start: 2018-11-02 | End: 2018-11-02 | Stop reason: SURG

## 2018-11-02 RX ORDER — ONDANSETRON 2 MG/ML
4 INJECTION INTRAMUSCULAR; INTRAVENOUS ONCE AS NEEDED
Status: DISCONTINUED | OUTPATIENT
Start: 2018-11-02 | End: 2018-11-02 | Stop reason: HOSPADM

## 2018-11-02 RX ADMIN — SODIUM CHLORIDE, POTASSIUM CHLORIDE, SODIUM LACTATE AND CALCIUM CHLORIDE: 600; 310; 30; 20 INJECTION, SOLUTION INTRAVENOUS at 08:20

## 2018-11-02 RX ADMIN — SODIUM CHLORIDE, POTASSIUM CHLORIDE, SODIUM LACTATE AND CALCIUM CHLORIDE 1000 ML: 600; 310; 30; 20 INJECTION, SOLUTION INTRAVENOUS at 07:58

## 2018-11-02 RX ADMIN — LIDOCAINE HYDROCHLORIDE 100 MG: 20 INJECTION, SOLUTION INFILTRATION; PERINEURAL at 08:21

## 2018-11-02 RX ADMIN — PROPOFOL 140 MCG/KG/MIN: 10 INJECTION, EMULSION INTRAVENOUS at 08:22

## 2018-11-02 RX ADMIN — PROPOFOL 100 MG: 10 INJECTION, EMULSION INTRAVENOUS at 08:21

## 2018-11-02 NOTE — BRIEF OP NOTE
ESOPHAGOGASTRODUODENOSCOPY, COLONOSCOPY  Progress Note    Freda Castañeda  11/2/2018    Pre-op Diagnosis:   Screening for colon cancer [Z12.11]  Gastroesophageal reflux disease, esophagitis presence not specified [K21.9]       Post-Op Diagnosis Codes:     * Screening for colon cancer [Z12.11]     * Gastroesophageal reflux disease, esophagitis presence not specified [K21.9]     * Erosive esophagitis [K22.10]     * Gastritis [K29.70]     * Duodenitis [535.6]     * Colon polyp [K63.5]     * Diverticulosis [K57.90]    Procedure/CPT® Codes:      Procedure(s):  ESOPHAGOGASTRODUODENOSCOPY with biopsies  COLONOSCOPY with cold polyps biopsy    Surgeon(s):  Haresh Westbrook MD    Anesthesia: Monitor Anesthesia Care    Staff:   Endo Technician: Priscilla Salgado RN  Endo Nurse: Arlette Hanley RN    Estimated Blood Loss: none    Urine Voided: * No values recorded between 11/2/2018  8:03 AM and 11/2/2018  8:51 AM *    Specimens:                ID Type Source Tests Collected by Time   A :  Tissue Gastric, Antrum TISSUE PATHOLOGY EXAM Haresh Westbrook MD 11/2/2018 0825   B :  Tissue Esophagus, Distal TISSUE PATHOLOGY EXAM Haresh Westbrook MD 11/2/2018 0826   C : cecal valve polyp Polyp Large Intestine, Cecum TISSUE PATHOLOGY EXAM Haresh Westbrook MD 11/2/2018 0840   D : rectal polyps x  Polyp Large Intestine, Rectum TISSUE PATHOLOGY EXAM Haresh Westbrook MD 11/2/2018 0849         Drains:      Findings: Mild Duodenitis  Mild Gastritis-Biopsy  Erosive Esophagitis-Biopsy    Colon to TI good Prep  Polyps(4) cold Biopsy  Rare Diverticulum    Complications: None      Haresh Westbrook MD     Date: 11/2/2018  Time: 8:55 AM

## 2018-11-02 NOTE — DISCHARGE INSTRUCTIONS
For the next 24 hours patient needs to be with a responsible adult.    For 24 hours DO NOT drive, operate machinery, appliances, drink alcohol, make important decisions or sign legal documents.    Start with a light or bland diet and advance to regular diet as tolerated.    Follow recommendations on procedure report provided by your doctor.    Call Dr. Westbrook for problems [phone]    Problems may include but not limited to: large amounts of bleeding, trouble breathing, repeated vomiting, severe unrelieved pain, fever or chills.

## 2018-11-02 NOTE — ANESTHESIA PREPROCEDURE EVALUATION
Anesthesia Evaluation     Patient summary reviewed and Nursing notes reviewed   NPO Solid Status: > 8 hours  NPO Liquid Status: > 2 hours           Airway   Mallampati: II  TM distance: >3 FB  Neck ROM: full  Dental - normal exam     Pulmonary - negative pulmonary ROS and normal exam   Cardiovascular - normal exam  Exercise tolerance: good (4-7 METS)    (+) hyperlipidemia,       Neuro/Psych- negative ROS  GI/Hepatic/Renal/Endo    (+)  GERD,      Musculoskeletal     Abdominal  - normal exam    Bowel sounds: normal.   Substance History - negative use     OB/GYN negative ob/gyn ROS         Other   (+) arthritis                     Anesthesia Plan    ASA 2     MAC     Anesthetic plan, all risks, benefits, and alternatives have been provided, discussed and informed consent has been obtained with: patient.

## 2018-11-02 NOTE — ANESTHESIA POSTPROCEDURE EVALUATION
Patient: Freda Castañeda    Procedure Summary     Date:  11/02/18 Room / Location:   [facility]    Anesthesia Start:  0820 Anesthesia Stop:      Procedures:       ESOPHAGOGASTRODUODENOSCOPY with biopsies (N/A Esophagus)      COLONOSCOPY with cold polyps biopsy (N/A ) Diagnosis:       Screening for colon cancer      Gastroesophageal reflux disease, esophagitis presence not specified      Erosive esophagitis      Gastritis      Duodenitis      Colon polyp      Diverticulosis      (Screening for colon cancer [Z12.11])      (Gastroesophageal reflux disease, esophagitis presence not specified [K21.9])    Surgeon:  Haresh Westbrook MD Provider:  Nuvia Holley MD    Anesthesia Type:  MAC ASA Status:  2          Anesthesia Type: MAC  Last vitals  BP   107/65 (11/02/18 0901)   Temp   36.6 °C (97.8 °F) (11/02/18 0759)   Pulse   78 (11/02/18 0901)   Resp   14 (11/02/18 0901)     SpO2   98 % (11/02/18 0901)     Post Anesthesia Care and Evaluation    Patient location during evaluation: PHASE II  Patient participation: complete - patient participated  Level of consciousness: sleepy but conscious  Pain management: adequate  Airway patency: patent  Anesthetic complications: No anesthetic complications    Cardiovascular status: acceptable  Respiratory status: acceptable  Hydration status: acceptable

## 2018-11-02 NOTE — H&P
Patient Care Team:  Krishan Robles MD as PCP - General (Internal Medicine)  Krishan Robles MD as PCP - Claims Attributed    CHIEF COMPLAINT: Persistant Heartburn, Screening for Colon Cancer    HISTORY OF PRESENT ILLNESS:    Last Colon >10 years, no previous EGD      Past Medical History:   Diagnosis Date   • GERD (gastroesophageal reflux disease)    • Hyperlipidemia    • Menopause 11/14/2017   • Need for diphtheria-tetanus-pertussis (Tdap) vaccine 3/23/2017   • Pap smear for cervical cancer screening 11/14/2017   • Seasonal allergies      Past Surgical History:   Procedure Laterality Date   • BURN TREATMENT     • CHOLECYSTECTOMY     • COLONOSCOPY     • HERNIA REPAIR     • TUBAL ABDOMINAL LIGATION       Family History   Problem Relation Age of Onset   • Breast cancer Sister    • Breast cancer Other    • Colon polyps Neg Hx    • Crohn's disease Neg Hx      Social History   Substance Use Topics   • Smoking status: Never Smoker   • Smokeless tobacco: Not on file   • Alcohol use No     Prescriptions Prior to Admission   Medication Sig Dispense Refill Last Dose   • acetaminophen (TYLENOL) 650 MG 8 hr tablet Take 2 tablets by mouth.   11/1/2018 at Unknown time   • ascorbic acid (VITAMIN C) 500 MG tablet Take 500 mg by mouth daily.   Past Week at Unknown time   • Cyanocobalamin (VITAMIN B 12 PO) Take  by mouth daily.   11/1/2018 at Unknown time   • diphenhydrAMINE (BENADRYL) 25 MG tablet Take 2 tablets by mouth at night as needed.   11/1/2018 at Unknown time   • docusate sodium (COLACE) 100 MG capsule Take 300 mg by mouth every night.   Past Week at Unknown time   • loratadine (CLARITIN) 10 MG tablet Take  by mouth daily.   11/2/2018 at Unknown time   • mometasone (NASONEX) 50 MCG/ACT nasal spray into each nostril daily.   11/2/2018 at Unknown time   • Multiple Vitamins-Minerals (CENTRUM SILVER PO) Take  by mouth daily.   Past Week at Unknown time   • Omega-3 Fatty Acids (FISH OIL) 1000 MG capsule capsule Take  by mouth  "daily.   Past Week at Unknown time   • rosuvastatin (CRESTOR) 10 MG tablet TAKE 1 TABLET DAILY 90 tablet 1 11/2/2018 at Unknown time   • vitamin E 100 UNIT capsule Take  by mouth.   Past Week at Unknown time   • ASPIRIN EC LO-DOSE PO Take 81 mg by mouth daily.   10/28/2018   • Cholecalciferol (VITAMIN D3) 2000 UNITS tablet Take 2,000 Units by mouth daily.   Taking   • ranitidine (ZANTAC) 150 MG capsule Take 1 capsule by mouth 2 (two) times a day. 30 capsule 0 Taking   • sodium phosphates (OSMOPREP) 1.102-0.398 g tablet tablet Starting at 3:00pm -4 tabs every 15 min for 5 doses (20 tabs), second dosing starting at 11:00pm 4 tabs every 15 min. For 2 doses (8 tabs) 28 tablet 0      Allergies:  Other    REVIEW OF SYSTEMS:  Please see the above history of present illness for pertinent positives and negatives.  The remainder of the patient's systems have been reviewed and are negative.     Vital Signs  Temp:  [97.8 °F (36.6 °C)] 97.8 °F (36.6 °C)  Heart Rate:  [84] 84  Resp:  [20] 20  BP: (136)/(80) 136/80    Flowsheet Rows      First Filed Value   Admission Height  162.6 cm (64\") Documented at 11/02/2018 0740   Admission Weight  72.6 kg (160 lb 1 oz) Documented at 11/02/2018 0740           Physical Exam:  Physical Exam   Constitutional: Patient appears well-developed and well-nourished and in no acute distress   HEENT:   Head: Normocephalic and atraumatic.   Eyes:  Pupils are equal, round, and reactive to light. EOM are intact. Sclera are anicteric and non-injected.  Mouth and Throat: Patient has moist mucous membranes. Oropharynx is clear of any erythema or exudate.     Neck: Neck supple. No JVD present. No thyromegaly present. No lymphadenopathy present.  Cardiovascular: Regular rate, regular rhythm, S1 normal and S2 normal.  Exam reveals no gallop and no friction rub.  No murmur heard.  Pulmonary/Chest: Lungs are clear to auscultation bilaterally. No respiratory distress. No wheezes. No rhonchi. No rales.   Abdominal: " Soft. Bowel sounds are normal. No distension and no mass. There is no hepatosplenomegaly. There is no tenderness.   Musculoskeletal: Normal Muscle tone  Extremities: No edema. Pulses are palpable in all 4 extremities.  Neurological: Patient is alert and oriented to person, place, and time. Cranial nerves II-XII are grossly intact with no focal deficits.  Skin: Skin is warm. No rash noted. Nails show no clubbing.  No cyanosis or erythema.    Debilities/Disabilities Identified: None  Emotional Behavior: Appropriate     Results Review:    I reviewed the patient's new clinical results.  Lab Results (most recent)     None          Imaging Results (most recent)     None        reviewed    ECG/EMG Results (most recent)     None        reviewed    Assessment/Plan     GERD  Screening for CRC    EGD/Colonsocpy    I discussed the patients findings and my recommendations with patient.     Haresh Westbrook MD  11/02/18  8:14 AM    Time: 10 min prior to procedure.

## 2018-11-07 DIAGNOSIS — E55.9 VITAMIN D DEFICIENCY: ICD-10-CM

## 2018-11-07 DIAGNOSIS — Z78.0 MENOPAUSE: Primary | ICD-10-CM

## 2018-11-07 DIAGNOSIS — E78.2 MIXED HYPERLIPIDEMIA: ICD-10-CM

## 2018-11-07 DIAGNOSIS — M19.90 ARTHRITIS: ICD-10-CM

## 2018-11-07 DIAGNOSIS — M30.0 POLYARTERITIS NODOSA (HCC): ICD-10-CM

## 2018-11-07 DIAGNOSIS — R73.01 IFG (IMPAIRED FASTING GLUCOSE): ICD-10-CM

## 2018-11-07 LAB
25(OH)D3+25(OH)D2 SERPL-MCNC: 77.4 NG/ML
ALBUMIN SERPL-MCNC: 4.8 G/DL (ref 3.5–5.2)
ALBUMIN/GLOB SERPL: 2.3 G/DL
ALP SERPL-CCNC: 62 U/L (ref 40–129)
ALT SERPL-CCNC: 17 U/L (ref 5–33)
AST SERPL-CCNC: 19 U/L (ref 5–32)
BASOPHILS # BLD AUTO: 0.04 10*3/MM3 (ref 0–0.2)
BASOPHILS NFR BLD AUTO: 0.8 % (ref 0–2)
BILIRUB SERPL-MCNC: 1.2 MG/DL (ref 0.2–1.2)
BUN SERPL-MCNC: 9 MG/DL (ref 8–23)
BUN/CREAT SERPL: 18 (ref 7–25)
CALCIUM SERPL-MCNC: 9.6 MG/DL (ref 8.8–10.5)
CHLORIDE SERPL-SCNC: 103 MMOL/L (ref 98–107)
CHOLEST SERPL-MCNC: 152 MG/DL (ref 0–200)
CO2 SERPL-SCNC: 27.3 MMOL/L (ref 22–29)
CREAT SERPL-MCNC: 0.5 MG/DL (ref 0.57–1)
CYTO UR: NORMAL
EOSINOPHIL # BLD AUTO: 0.04 10*3/MM3 (ref 0.1–0.3)
EOSINOPHIL NFR BLD AUTO: 0.8 % (ref 0–4)
ERYTHROCYTE [DISTWIDTH] IN BLOOD BY AUTOMATED COUNT: 13.2 % (ref 11.5–14.5)
GLOBULIN SER CALC-MCNC: 2.1 GM/DL
GLUCOSE SERPL-MCNC: 95 MG/DL (ref 65–99)
HBA1C MFR BLD: 5.5 % (ref 4.8–5.6)
HCT VFR BLD AUTO: 41 % (ref 37–47)
HDLC SERPL-MCNC: 46 MG/DL (ref 40–60)
HGB BLD-MCNC: 13.6 G/DL (ref 12–16)
IMM GRANULOCYTES # BLD: 0.01 10*3/MM3 (ref 0–0.03)
IMM GRANULOCYTES NFR BLD: 0.2 % (ref 0–0.5)
LAB AP CASE REPORT: NORMAL
LDLC SERPL CALC-MCNC: 71 MG/DL (ref 0–100)
LDLC/HDLC SERPL: 1.54 {RATIO}
LYMPHOCYTES # BLD AUTO: 2.05 10*3/MM3 (ref 0.6–4.8)
LYMPHOCYTES NFR BLD AUTO: 39.4 % (ref 20–45)
MCH RBC QN AUTO: 31.6 PG (ref 27–31)
MCHC RBC AUTO-ENTMCNC: 33.2 G/DL (ref 31–37)
MCV RBC AUTO: 95.3 FL (ref 81–99)
MONOCYTES # BLD AUTO: 0.49 10*3/MM3 (ref 0–1)
MONOCYTES NFR BLD AUTO: 9.4 % (ref 3–8)
NEUTROPHILS # BLD AUTO: 2.57 10*3/MM3 (ref 1.5–8.3)
NEUTROPHILS NFR BLD AUTO: 49.4 % (ref 45–70)
NRBC BLD AUTO-RTO: 0 /100 WBC (ref 0–0)
PATH REPORT.ADDENDUM SPEC: NORMAL
PATH REPORT.FINAL DX SPEC: NORMAL
PATH REPORT.GROSS SPEC: NORMAL
PLATELET # BLD AUTO: 297 10*3/MM3 (ref 140–500)
POTASSIUM SERPL-SCNC: 4.5 MMOL/L (ref 3.5–5.2)
PROT SERPL-MCNC: 6.9 G/DL (ref 6–8.5)
RBC # BLD AUTO: 4.3 10*6/MM3 (ref 4.2–5.4)
SODIUM SERPL-SCNC: 141 MMOL/L (ref 136–145)
TRIGL SERPL-MCNC: 175 MG/DL (ref 0–150)
TSH SERPL DL<=0.005 MIU/L-ACNC: 1.23 MIU/ML (ref 0.27–4.2)
VLDLC SERPL CALC-MCNC: 35 MG/DL (ref 7–27)
WBC # BLD AUTO: 5.2 10*3/MM3 (ref 4.8–10.8)

## 2018-11-15 ENCOUNTER — OFFICE VISIT (OUTPATIENT)
Dept: INTERNAL MEDICINE | Facility: CLINIC | Age: 68
End: 2018-11-15

## 2018-11-15 VITALS
DIASTOLIC BLOOD PRESSURE: 74 MMHG | OXYGEN SATURATION: 99 % | HEART RATE: 98 BPM | SYSTOLIC BLOOD PRESSURE: 130 MMHG | RESPIRATION RATE: 14 BRPM | BODY MASS INDEX: 27.66 KG/M2 | WEIGHT: 162 LBS | HEIGHT: 64 IN

## 2018-11-15 DIAGNOSIS — Z23 NEED FOR VACCINATION FOR STREP PNEUMONIAE: ICD-10-CM

## 2018-11-15 DIAGNOSIS — K21.9 GASTROESOPHAGEAL REFLUX DISEASE, ESOPHAGITIS PRESENCE NOT SPECIFIED: ICD-10-CM

## 2018-11-15 DIAGNOSIS — Z00.00 MEDICARE ANNUAL WELLNESS VISIT, SUBSEQUENT: ICD-10-CM

## 2018-11-15 DIAGNOSIS — R07.89 ATYPICAL CHEST PAIN: ICD-10-CM

## 2018-11-15 DIAGNOSIS — E78.2 MIXED HYPERLIPIDEMIA: Primary | ICD-10-CM

## 2018-11-15 PROCEDURE — G0009 ADMIN PNEUMOCOCCAL VACCINE: HCPCS | Performed by: INTERNAL MEDICINE

## 2018-11-15 PROCEDURE — 99214 OFFICE O/P EST MOD 30 MIN: CPT | Performed by: INTERNAL MEDICINE

## 2018-11-15 PROCEDURE — 90732 PPSV23 VACC 2 YRS+ SUBQ/IM: CPT | Performed by: INTERNAL MEDICINE

## 2018-11-15 PROCEDURE — G0439 PPPS, SUBSEQ VISIT: HCPCS | Performed by: INTERNAL MEDICINE

## 2018-11-15 PROCEDURE — 93000 ELECTROCARDIOGRAM COMPLETE: CPT | Performed by: INTERNAL MEDICINE

## 2018-11-15 RX ORDER — OMEPRAZOLE 20 MG/1
20 CAPSULE, DELAYED RELEASE ORAL DAILY
Qty: 90 CAPSULE | Refills: 3 | Status: SHIPPED | OUTPATIENT
Start: 2018-11-15 | End: 2019-11-19

## 2018-11-15 NOTE — PROGRESS NOTES
QUICK REFERENCE INFORMATION:  The ABCs of the Annual Wellness Visit    Subsequent Medicare Wellness Visit    HEALTH RISK ASSESSMENT    1950    Recent Hospitalizations:  No hospitalization(s) within the last year..        Current Medical Providers:  Patient Care Team:  Krishan Robles MD as PCP - General (Internal Medicine)  Krishan Robles MD as PCP - Claims Attributed        Smoking Status:  Social History     Tobacco Use   Smoking Status Never Smoker       Alcohol Consumption:  Social History     Substance and Sexual Activity   Alcohol Use No       Depression Screen:   PHQ-2/PHQ-9 Depression Screening 11/15/2018   Little interest or pleasure in doing things 0   Feeling down, depressed, or hopeless 0   Total Score 0       Health Habits and Functional and Cognitive Screening:  Functional & Cognitive Status 11/15/2018   Do you have difficulty preparing food and eating? No   Do you have difficulty bathing yourself, getting dressed or grooming yourself? No   Do you have difficulty using the toilet? No   Do you have difficulty moving around from place to place? No   Do you have trouble with steps or getting out of a bed or a chair? No   In the past year have you fallen or experienced a near fall? No   Current Diet Well Balanced Diet   Dental Exam Up to date   Eye Exam Up to date   Do you need help using the phone?  No   Are you deaf or do you have serious difficulty hearing?  No   Do you need help with transportation? No   Do you need help shopping? No   Do you need help preparing meals?  No   Do you need help with housework?  No   Do you need help with laundry? No   Do you need help taking your medications? No   Do you need help managing money? No   Do you ever drive or ride in a car without wearing a seat belt? No   Do you have difficulty concentrating, remembering or making decisions? -           Does the patient have evidence of cognitive impairment? No    Aspirin use counseling: Taking ASA appropriately as  indicated      Recent Lab Results:  CMP:  Lab Results   Component Value Date    GLU 95 11/07/2018    BUN 9 11/07/2018    CREATININE 0.50 (L) 11/07/2018    EGFRIFNONA 123 11/07/2018    EGFRIFAFRI 149 11/07/2018    BCR 18.0 11/07/2018     11/07/2018    K 4.5 11/07/2018    CO2 27.3 11/07/2018    CALCIUM 9.6 11/07/2018    PROTENTOTREF 6.9 11/07/2018    ALBUMIN 4.80 11/07/2018    LABGLOBREF 2.1 11/07/2018    LABIL2 2.3 11/07/2018    BILITOT 1.2 11/07/2018    ALKPHOS 62 11/07/2018    AST 19 11/07/2018    ALT 17 11/07/2018     Lipid Panel:  Lab Results   Component Value Date    TRIG 175 (H) 11/07/2018    HDL 46 11/07/2018    VLDL 35 (H) 11/07/2018    LDLHDL 1.54 11/07/2018     HbA1c:  Lab Results   Component Value Date    HGBA1C 5.50 11/07/2018       Visual Acuity:  No exam data present    Age-appropriate Screening Schedule:  Refer to the list below for future screening recommendations based on patient's age, sex and/or medical conditions. Orders for these recommended tests are listed in the plan section. The patient has been provided with a written plan.    Health Maintenance   Topic Date Due   • ZOSTER VACCINE (2 of 2) 05/18/2017   • PNEUMOCOCCAL VACCINES (65+ LOW/MEDIUM RISK) (2 of 2 - PPSV23) 11/14/2018   • LIPID PANEL  11/07/2019   • MAMMOGRAM  12/14/2019   • COLONOSCOPY  11/02/2023   • TDAP/TD VACCINES (2 - Td) 03/23/2027   • INFLUENZA VACCINE  Addressed        Subjective   History of Present Illness    Freda Castañeda is a 68 y.o. female who presents for an Subsequent Wellness Visit.  Feeling like an elephant on her chest with recent walking. Had EGD now on prilosec.  Her reflux is exertional.  She denies syncope or headache. Recent fam history of PFO in 2 sibs.      No dyspnea, just reflux.    She is otherwise well. No diaphoresis.  Some minor age related aches and pains.  Has new floater in her R eye.  Seeing Amarjit Mcelroy to take care of that. She did have lasix in the past.    The following portions of the  patient's history were reviewed and updated as appropriate: allergies, current medications, past family history, past medical history, past social history, past surgical history and problem list.    Outpatient Medications Prior to Visit   Medication Sig Dispense Refill   • acetaminophen (TYLENOL) 650 MG 8 hr tablet Take 2 tablets by mouth.     • ascorbic acid (VITAMIN C) 500 MG tablet Take 500 mg by mouth daily.     • ASPIRIN EC LO-DOSE PO Take 81 mg by mouth daily.     • Cholecalciferol (VITAMIN D3) 2000 UNITS tablet Take 2,000 Units by mouth daily.     • Cyanocobalamin (VITAMIN B 12 PO) Take  by mouth daily.     • diphenhydrAMINE (BENADRYL) 25 MG tablet Take 2 tablets by mouth at night as needed.     • docusate sodium (COLACE) 100 MG capsule Take 300 mg by mouth every night.     • loratadine (CLARITIN) 10 MG tablet Take  by mouth daily.     • mometasone (NASONEX) 50 MCG/ACT nasal spray into each nostril daily.     • Multiple Vitamins-Minerals (CENTRUM SILVER PO) Take  by mouth daily.     • Omega-3 Fatty Acids (FISH OIL) 1000 MG capsule capsule Take  by mouth daily.     • omeprazole (priLOSEC) 20 MG capsule Take 1 capsule by mouth Daily. 90 capsule 3   • rosuvastatin (CRESTOR) 10 MG tablet TAKE 1 TABLET DAILY 90 tablet 1   • vitamin E 100 UNIT capsule Take  by mouth.       No facility-administered medications prior to visit.        Patient Active Problem List   Diagnosis   • Hay fever   • Arthritis   • Hematochezia   • Diarrhea   • Gastroesophageal reflux disease   • Hyperlipidemia   • Polyarteritis nodosa (CMS/HCC)   • Hordeolum externum   • Arthritis of temporomandibular joint   • Urinary tract infection   • Need for diphtheria-tetanus-pertussis (Tdap) vaccine   • Menopause   • Screening for colon cancer       Advance Care Planning:  done    Identification of Risk Factors:  Risk factors include: none.    Review of Systems   Constitutional: Negative.  Negative for appetite change, fatigue, fever and unexpected  weight change.   HENT: Negative for sinus pressure and trouble swallowing.         Floater   Respiratory: Positive for chest tightness. Negative for cough.    Cardiovascular: Positive for chest pain. Negative for palpitations and leg swelling.   Gastrointestinal: Negative.  Negative for constipation and diarrhea.        Heartburn  Feels liek elephant sitting on chest   Genitourinary: Negative.  Negative for dysuria, frequency, hematuria, pelvic pain and vaginal discharge.   Musculoskeletal: Negative.    Skin: Negative.    Neurological: Negative for dizziness, light-headedness and headaches.   Hematological: Negative.    Psychiatric/Behavioral: Negative.    All other systems reviewed and are negative.      Compared to one year ago, the patient feels her physical health is the same.  Compared to one year ago, the patient feels her mental health is the same.    Objective     Physical Exam   Constitutional: She is oriented to person, place, and time. She appears well-developed and well-nourished.   HENT:   Head: Normocephalic and atraumatic.   Right Ear: External ear normal.   Left Ear: External ear normal.   Eyes: EOM are normal. Pupils are equal, round, and reactive to light.   Neck: Normal range of motion. Neck supple. No JVD present. No tracheal deviation present. No thyromegaly present.   Cardiovascular: Normal rate and regular rhythm. Exam reveals no friction rub.   No murmur heard.  Pulmonary/Chest: Effort normal and breath sounds normal.   Lymphadenopathy:     She has no cervical adenopathy.   Neurological: She is alert and oriented to person, place, and time.   Skin: Skin is warm and dry.   Psychiatric: She has a normal mood and affect. Her behavior is normal.   Vitals reviewed.    ECG 12 Lead  Date/Time: 11/15/2018 2:49 PM  Performed by: Krishan Robles MD  Authorized by: Krishan Robles MD   Comparison: not compared with previous ECG   Rhythm: sinus rhythm  Rate: normal  BPM: 88  ST Segments: ST segments  "normal  T Waves: T waves normal  QRS axis: normal                Vitals:    11/15/18 1325   BP: 130/74   Pulse: 98   Resp: 14   SpO2: 99%   Weight: 73.5 kg (162 lb)   Height: 162.6 cm (64\")       Patient's Body mass index is 27.81 kg/m². BMI is within normal parameters. No follow-up required.      Assessment/Plan   Patient Self-Management and Personalized Health Advice  The patient has been provided with information about: none and preventive services including:   · none.    Visit Diagnoses:  No diagnosis found.    No orders of the defined types were placed in this encounter.      Outpatient Encounter Medications as of 11/15/2018   Medication Sig Dispense Refill   • acetaminophen (TYLENOL) 650 MG 8 hr tablet Take 2 tablets by mouth.     • ascorbic acid (VITAMIN C) 500 MG tablet Take 500 mg by mouth daily.     • ASPIRIN EC LO-DOSE PO Take 81 mg by mouth daily.     • Cholecalciferol (VITAMIN D3) 2000 UNITS tablet Take 2,000 Units by mouth daily.     • Cyanocobalamin (VITAMIN B 12 PO) Take  by mouth daily.     • diphenhydrAMINE (BENADRYL) 25 MG tablet Take 2 tablets by mouth at night as needed.     • docusate sodium (COLACE) 100 MG capsule Take 300 mg by mouth every night.     • loratadine (CLARITIN) 10 MG tablet Take  by mouth daily.     • mometasone (NASONEX) 50 MCG/ACT nasal spray into each nostril daily.     • Multiple Vitamins-Minerals (CENTRUM SILVER PO) Take  by mouth daily.     • Omega-3 Fatty Acids (FISH OIL) 1000 MG capsule capsule Take  by mouth daily.     • omeprazole (priLOSEC) 20 MG capsule Take 1 capsule by mouth Daily. 90 capsule 3   • rosuvastatin (CRESTOR) 10 MG tablet TAKE 1 TABLET DAILY 90 tablet 1   • vitamin E 100 UNIT capsule Take  by mouth.     • [DISCONTINUED] omeprazole (priLOSEC) 40 MG capsule Take 1 capsule by mouth Daily. 90 capsule 3     No facility-administered encounter medications on file as of 11/15/2018.        Reviewed use of high risk medication in the elderly: yes  Reviewed for " potential of harmful drug interactions in the elderly: yes    Follow Up:  No Follow-up on file.     An After Visit Summary and PPPS with all of these plans were given to the patient.

## 2019-04-29 DIAGNOSIS — E78.2 MIXED HYPERLIPIDEMIA: ICD-10-CM

## 2019-04-29 RX ORDER — ROSUVASTATIN CALCIUM 10 MG/1
10 TABLET, COATED ORAL DAILY
Qty: 90 TABLET | Refills: 1 | Status: SHIPPED | OUTPATIENT
Start: 2019-04-29 | End: 2019-10-26 | Stop reason: SDUPTHER

## 2019-10-26 DIAGNOSIS — E78.2 MIXED HYPERLIPIDEMIA: ICD-10-CM

## 2019-10-28 RX ORDER — ROSUVASTATIN CALCIUM 10 MG/1
TABLET, COATED ORAL
Qty: 30 TABLET | Refills: 0 | Status: SHIPPED | OUTPATIENT
Start: 2019-10-28 | End: 2019-11-19 | Stop reason: SDUPTHER

## 2019-11-11 ENCOUNTER — DOCUMENTATION (OUTPATIENT)
Dept: INTERNAL MEDICINE | Facility: CLINIC | Age: 69
End: 2019-11-11

## 2019-11-11 DIAGNOSIS — Z11.59 NEED FOR HEPATITIS C SCREENING TEST: ICD-10-CM

## 2019-11-11 DIAGNOSIS — E78.2 MIXED HYPERLIPIDEMIA: ICD-10-CM

## 2019-11-11 DIAGNOSIS — R73.01 IFG (IMPAIRED FASTING GLUCOSE): ICD-10-CM

## 2019-11-11 DIAGNOSIS — E78.5 HYPERLIPIDEMIA, UNSPECIFIED HYPERLIPIDEMIA TYPE: ICD-10-CM

## 2019-11-11 DIAGNOSIS — Z00.00 MEDICARE ANNUAL WELLNESS VISIT, SUBSEQUENT: Primary | ICD-10-CM

## 2019-11-11 DIAGNOSIS — E55.9 VITAMIN D DEFICIENCY: ICD-10-CM

## 2019-11-11 DIAGNOSIS — Z13.29 THYROID DISORDER SCREENING: ICD-10-CM

## 2019-11-12 ENCOUNTER — RESULTS ENCOUNTER (OUTPATIENT)
Dept: INTERNAL MEDICINE | Facility: CLINIC | Age: 69
End: 2019-11-12

## 2019-11-12 DIAGNOSIS — E78.2 MIXED HYPERLIPIDEMIA: ICD-10-CM

## 2019-11-12 DIAGNOSIS — Z13.29 THYROID DISORDER SCREENING: ICD-10-CM

## 2019-11-12 DIAGNOSIS — E55.9 VITAMIN D DEFICIENCY: ICD-10-CM

## 2019-11-12 DIAGNOSIS — E78.5 HYPERLIPIDEMIA, UNSPECIFIED HYPERLIPIDEMIA TYPE: ICD-10-CM

## 2019-11-12 DIAGNOSIS — Z00.00 MEDICARE ANNUAL WELLNESS VISIT, SUBSEQUENT: ICD-10-CM

## 2019-11-12 DIAGNOSIS — Z11.59 NEED FOR HEPATITIS C SCREENING TEST: ICD-10-CM

## 2019-11-12 DIAGNOSIS — R73.01 IFG (IMPAIRED FASTING GLUCOSE): ICD-10-CM

## 2019-11-13 LAB
25(OH)D3+25(OH)D2 SERPL-MCNC: 57.5 NG/ML (ref 30–100)
ALBUMIN SERPL-MCNC: 4.8 G/DL (ref 3.5–5.2)
ALBUMIN/GLOB SERPL: 2.3 G/DL
ALP SERPL-CCNC: 63 U/L (ref 39–117)
ALT SERPL-CCNC: 17 U/L (ref 1–33)
AST SERPL-CCNC: 18 U/L (ref 1–32)
BASOPHILS # BLD AUTO: 0.02 10*3/MM3 (ref 0–0.2)
BASOPHILS NFR BLD AUTO: 0.4 % (ref 0–1.5)
BILIRUB SERPL-MCNC: 1.4 MG/DL (ref 0.2–1.2)
BUN SERPL-MCNC: 10 MG/DL (ref 8–23)
BUN/CREAT SERPL: 17.5 (ref 7–25)
CALCIUM SERPL-MCNC: 9.3 MG/DL (ref 8.6–10.5)
CHLORIDE SERPL-SCNC: 101 MMOL/L (ref 98–107)
CHOLEST SERPL-MCNC: 153 MG/DL (ref 0–200)
CO2 SERPL-SCNC: 24.7 MMOL/L (ref 22–29)
CREAT SERPL-MCNC: 0.57 MG/DL (ref 0.57–1)
EOSINOPHIL # BLD AUTO: 0.02 10*3/MM3 (ref 0–0.4)
EOSINOPHIL NFR BLD AUTO: 0.4 % (ref 0.3–6.2)
ERYTHROCYTE [DISTWIDTH] IN BLOOD BY AUTOMATED COUNT: 13.1 % (ref 12.3–15.4)
GLOBULIN SER CALC-MCNC: 2.1 GM/DL
GLUCOSE SERPL-MCNC: 91 MG/DL (ref 65–99)
HBA1C MFR BLD: 5.7 % (ref 4.8–5.6)
HCT VFR BLD AUTO: 40.8 % (ref 34–46.6)
HCV AB S/CO SERPL IA: 0.1 S/CO RATIO (ref 0–0.9)
HCV AB SERPL QL IA: NORMAL
HDLC SERPL-MCNC: 45 MG/DL (ref 40–60)
HGB BLD-MCNC: 13.8 G/DL (ref 12–15.9)
IMM GRANULOCYTES # BLD AUTO: 0.01 10*3/MM3 (ref 0–0.05)
IMM GRANULOCYTES NFR BLD AUTO: 0.2 % (ref 0–0.5)
LDLC SERPL CALC-MCNC: 77 MG/DL (ref 0–100)
LDLC/HDLC SERPL: 1.72 {RATIO}
LYMPHOCYTES # BLD AUTO: 1.72 10*3/MM3 (ref 0.7–3.1)
LYMPHOCYTES NFR BLD AUTO: 36.8 % (ref 19.6–45.3)
MCH RBC QN AUTO: 31.6 PG (ref 26.6–33)
MCHC RBC AUTO-ENTMCNC: 33.8 G/DL (ref 31.5–35.7)
MCV RBC AUTO: 93.4 FL (ref 79–97)
MONOCYTES # BLD AUTO: 0.46 10*3/MM3 (ref 0.1–0.9)
MONOCYTES NFR BLD AUTO: 9.9 % (ref 5–12)
NEUTROPHILS # BLD AUTO: 2.44 10*3/MM3 (ref 1.7–7)
NEUTROPHILS NFR BLD AUTO: 52.3 % (ref 42.7–76)
NRBC BLD AUTO-RTO: 0 /100 WBC (ref 0–0.2)
PLATELET # BLD AUTO: 301 10*3/MM3 (ref 140–450)
POTASSIUM SERPL-SCNC: 4.2 MMOL/L (ref 3.5–5.2)
PROT SERPL-MCNC: 6.9 G/DL (ref 6–8.5)
RBC # BLD AUTO: 4.37 10*6/MM3 (ref 3.77–5.28)
SODIUM SERPL-SCNC: 141 MMOL/L (ref 136–145)
TRIGL SERPL-MCNC: 153 MG/DL (ref 0–150)
TSH SERPL DL<=0.005 MIU/L-ACNC: 1.19 UIU/ML (ref 0.27–4.2)
VLDLC SERPL CALC-MCNC: 30.6 MG/DL
WBC # BLD AUTO: 4.67 10*3/MM3 (ref 3.4–10.8)

## 2019-11-19 ENCOUNTER — OFFICE VISIT (OUTPATIENT)
Dept: INTERNAL MEDICINE | Facility: CLINIC | Age: 69
End: 2019-11-19

## 2019-11-19 VITALS
DIASTOLIC BLOOD PRESSURE: 80 MMHG | HEART RATE: 91 BPM | WEIGHT: 161.2 LBS | HEIGHT: 64 IN | TEMPERATURE: 98.1 F | RESPIRATION RATE: 14 BRPM | BODY MASS INDEX: 27.52 KG/M2 | OXYGEN SATURATION: 99 % | SYSTOLIC BLOOD PRESSURE: 124 MMHG

## 2019-11-19 DIAGNOSIS — E66.9 OBESITY (BMI 30-39.9): ICD-10-CM

## 2019-11-19 DIAGNOSIS — Z12.31 ENCOUNTER FOR SCREENING MAMMOGRAM FOR MALIGNANT NEOPLASM OF BREAST: ICD-10-CM

## 2019-11-19 DIAGNOSIS — Z78.0 POST-MENOPAUSAL: ICD-10-CM

## 2019-11-19 DIAGNOSIS — E78.2 MIXED HYPERLIPIDEMIA: ICD-10-CM

## 2019-11-19 DIAGNOSIS — R73.01 IFG (IMPAIRED FASTING GLUCOSE): ICD-10-CM

## 2019-11-19 DIAGNOSIS — Z00.00 MEDICARE ANNUAL WELLNESS VISIT, SUBSEQUENT: Primary | ICD-10-CM

## 2019-11-19 PROBLEM — Z12.11 SCREENING FOR COLON CANCER: Status: RESOLVED | Noted: 2018-09-06 | Resolved: 2019-11-19

## 2019-11-19 PROBLEM — Z23 NEED FOR VACCINATION FOR STREP PNEUMONIAE: Status: RESOLVED | Noted: 2018-11-15 | Resolved: 2019-11-19

## 2019-11-19 PROBLEM — Z23 NEED FOR DIPHTHERIA-TETANUS-PERTUSSIS (TDAP) VACCINE: Status: RESOLVED | Noted: 2017-03-23 | Resolved: 2019-11-19

## 2019-11-19 PROCEDURE — 99214 OFFICE O/P EST MOD 30 MIN: CPT | Performed by: INTERNAL MEDICINE

## 2019-11-19 PROCEDURE — G0439 PPPS, SUBSEQ VISIT: HCPCS | Performed by: INTERNAL MEDICINE

## 2019-11-19 RX ORDER — ROSUVASTATIN CALCIUM 10 MG/1
10 TABLET, COATED ORAL DAILY
Qty: 90 TABLET | Refills: 3 | Status: SHIPPED | OUTPATIENT
Start: 2019-11-19 | End: 2020-11-04

## 2019-11-19 NOTE — PATIENT INSTRUCTIONS
Medicare Wellness  Personal Prevention Plan of Service     Date of Office Visit:  2019  Encounter Provider:  Arlette Moss MD  Place of Service:  [facility]  Patient Name: Freda Castañeda  :  1950    As part of the Medicare Wellness portion of your visit today, we are providing you with this personalized preventive plan of services (PPPS). This plan is based upon recommendations of the [place] Preventive Services Task Force (USPSTF) and the Advisory Committee on Immunization Practices (ACIP).    This lists the preventive care services that should be considered, and provides dates of when you are due. Items listed as completed are up-to-date and do not require any further intervention.    Health Maintenance   Topic Date Due   • ZOSTER VACCINE (2 of 2) 2020 (Originally 2017)   • MAMMOGRAM  2019   • LIPID PANEL  2020   • MEDICARE ANNUAL WELLNESS  2020   • COLONOSCOPY  2023   • TDAP/TD VACCINES (2 - Td) 2027   • HEPATITIS C SCREENING  Completed   • PNEUMOCOCCAL VACCINES (65+ LOW/MEDIUM RISK)  Completed   • INFLUENZA VACCINE  Addressed       Orders Placed This Encounter   Procedures   • DEXA Bone Density Axial     Order Specific Question:   Reason for Exam:     Answer:   psot-menopausal   • Mammo Screening Bilateral With CAD     Order Specific Question:   Reason for Exam:     Answer:   screening   • POC Urinalysis Dipstick, Automated       Return in about 1 year (around 2020) for Recheck, Medicare Wellness.

## 2019-11-19 NOTE — PROGRESS NOTES
QUICK REFERENCE INFORMATION:  The ABCs of the Annual Wellness Visit    Subsequent Medicare Wellness Visit     HEALTH RISK ASSESSMENT    : 1950    Recent Hospitalizations:  No hospitalization(s) within the last year..      Current Medical Providers:  Patient Care Team:  Arlette Moss MD as PCP - General (Internal Medicine)  Krishan Robles MD as PCP - Claims Attributed        Smoking Status:  Social History     Tobacco Use   Smoking Status Never Smoker   Smokeless Tobacco Never Used       Alcohol Consumption:  Social History     Substance and Sexual Activity   Alcohol Use No       Depression Screen:   PHQ-2/PHQ-9 Depression Screening 2019   Little interest or pleasure in doing things 0   Feeling down, depressed, or hopeless 0   Total Score 0       Health Habits and Functional and Cognitive Screening:  Functional & Cognitive Status 2019   Do you have difficulty preparing food and eating? No   Do you have difficulty bathing yourself, getting dressed or grooming yourself? No   Do you have difficulty using the toilet? No   Do you have difficulty moving around from place to place? No   Do you have trouble with steps or getting out of a bed or a chair? No   Current Diet Limited Junk Food   Dental Exam Up to date   Eye Exam Up to date   Exercise (times per week) 0   Current Exercise Activities Include None   Do you need help using the phone?  No   Are you deaf or do you have serious difficulty hearing?  No   Do you need help with transportation? No   Do you need help shopping? No   Do you need help preparing meals?  No   Do you need help with housework?  No   Do you need help with laundry? No   Do you need help taking your medications? No   Do you need help managing money? No   Do you ever drive or ride in a car without wearing a seat belt? No   Have you felt unusual stress, anger or loneliness in the last month? No   Who do you live with? Spouse   If you need help, do you have trouble finding  someone available to you? No   Have you been bothered in the last four weeks by sexual problems? No   Do you have difficulty concentrating, remembering or making decisions? No           Does the patient have evidence of cognitive impairment? No    Asiprin use counseling: Does not need ASA (and currently is not on it)      Recent Lab Results:    Lab Results   Component Value Date    GLU 91 11/12/2019     Lab Results   Component Value Date    HGBA1C 5.70 (H) 11/12/2019     Lab Results   Component Value Date    TRIG 153 (H) 11/12/2019    HDL 45 11/12/2019    VLDL 30.6 11/12/2019    LDLHDL 1.72 11/12/2019           Age-appropriate Screening Schedule:  Refer to the list below for future screening recommendations based on patient's age, sex and/or medical conditions. Orders for these recommended tests are listed in the plan section. The patient has been provided with a written plan.    Health Maintenance   Topic Date Due   • ZOSTER VACCINE (2 of 2) 11/27/2020 (Originally 5/18/2017)   • MAMMOGRAM  12/14/2019   • LIPID PANEL  11/12/2020   • COLONOSCOPY  11/02/2023   • TDAP/TD VACCINES (2 - Td) 03/23/2027   • PNEUMOCOCCAL VACCINES (65+ LOW/MEDIUM RISK)  Completed   • INFLUENZA VACCINE  Addressed        Subjective   History of Present Illness    Freda Castañeda is a 69 y.o. female who presents for an Annual Wellness Visit.    The following portions of the patient's history were reviewed and updated as appropriate: allergies, current medications, past family history, past medical history, past social history, past surgical history and problem list.    70 yo F here to establish care and have her AWV. She is doing well. Takes Crestor daily. Tolerates this well. No CP or SOB. She used to walk every day, but is helping with grand babies a lot now and doesn't do any regular exercise.     She has allergies and takes Claritin and Benadryl every night.     She has not had a mammogram or DEXA scan in close to two years. C-scope is up  to date. Due in 5 years.     She has chronic constipation and takes Dulcolax when she needs it. Feels that she has diarrhea when she takes it everyday.     Outpatient Medications Prior to Visit   Medication Sig Dispense Refill   • acetaminophen (TYLENOL) 650 MG 8 hr tablet Take 2 tablets by mouth.     • Apple Keron Vn-Grn Tea-Bit Or-Cr (APPLE CIDER VINEGAR PLUS PO) Take  by mouth.     • Cholecalciferol (VITAMIN D3) 2000 UNITS tablet Take 2,000 Units by mouth daily.     • loratadine (CLARITIN) 10 MG tablet Take  by mouth daily.     • mometasone (NASONEX) 50 MCG/ACT nasal spray into each nostril daily.     • Omega-3 Fatty Acids (FISH OIL) 1000 MG capsule capsule Take  by mouth daily.     • ascorbic acid (VITAMIN C) 500 MG tablet Take 500 mg by mouth daily.     • ASPIRIN EC LO-DOSE PO Take 81 mg by mouth daily.     • Cyanocobalamin (VITAMIN B 12 PO) Take  by mouth daily.     • diphenhydrAMINE (BENADRYL) 25 MG tablet Take 2 tablets by mouth at night as needed.     • docusate sodium (COLACE) 100 MG capsule Take 300 mg by mouth every night.     • Multiple Vitamins-Minerals (CENTRUM SILVER PO) Take  by mouth daily.     • omeprazole (priLOSEC) 20 MG capsule Take 1 capsule by mouth Daily. 90 capsule 3   • rosuvastatin (CRESTOR) 10 MG tablet TAKE 1 TABLET DAILY 30 tablet 0   • vitamin E 100 UNIT capsule Take  by mouth.       No facility-administered medications prior to visit.        Patient Active Problem List   Diagnosis   • Hay fever   • Arthritis   • Hematochezia   • Gastroesophageal reflux disease   • Hyperlipidemia   • Polyarteritis nodosa (CMS/HCC)   • Arthritis of temporomandibular joint   • Menopause   • Atypical chest pain   • Medicare annual wellness visit, subsequent   • Obesity (BMI 30-39.9)   • IFG (impaired fasting glucose)       Advance Care Planning:  Patient has an advance directive - a copy has not been provided. Have asked the patient to send this to us to add to record    Identification of Risk  Factors:  Risk factors include: Advance Directive Discussion  Breast Cancer/Mammogram Screening  Cardiovascular risk  Diabetic Lab Screening   Inactivity/Sedentary  Obesity/Overweight     Review of Systems   Constitutional: Positive for fatigue. Negative for chills and unexpected weight change.   HENT: Negative for congestion and rhinorrhea.    Respiratory: Negative for cough and shortness of breath.    Gastrointestinal: Negative for abdominal pain and constipation.   Musculoskeletal: Negative for arthralgias.   Allergic/Immunologic: Positive for environmental allergies.   Neurological: Negative for dizziness and headaches.       Compared to one year ago, the patient feels her physical health is the same.  Compared to one year ago, the patient feels her mental health is the same.    Objective     Physical Exam   Constitutional: She is oriented to person, place, and time. She appears well-developed and well-nourished. No distress.   HENT:   Head: Normocephalic and atraumatic.   Right Ear: Hearing, tympanic membrane, external ear and ear canal normal.   Left Ear: Hearing, tympanic membrane, external ear and ear canal normal.   Nose: Nose normal.   Mouth/Throat: Uvula is midline, oropharynx is clear and moist and mucous membranes are normal. No oropharyngeal exudate, posterior oropharyngeal edema or posterior oropharyngeal erythema. Tonsils are 0 on the right. Tonsils are 0 on the left. No tonsillar exudate.   Eyes: Conjunctivae are normal. Right eye exhibits no discharge. Left eye exhibits no discharge. No scleral icterus.   Neck: Trachea normal. Neck supple. Carotid bruit is not present. No thyroid mass and no thyromegaly present.   Cardiovascular: Normal rate, regular rhythm and normal heart sounds. Exam reveals no gallop and no friction rub.   No murmur heard.  Pulmonary/Chest: Effort normal and breath sounds normal. No respiratory distress. She has no wheezes. She has no rales.   Abdominal: Soft. Bowel sounds are  "normal. She exhibits no distension and no mass. There is no tenderness. There is no guarding.   Lymphadenopathy:     She has no cervical adenopathy.   Neurological: She is alert and oriented to person, place, and time.   Skin: Skin is warm. No rash noted.   Psychiatric: She has a normal mood and affect. Her behavior is normal.   Nursing note and vitals reviewed.      Vitals:    11/19/19 1002   BP: 124/80   BP Location: Left arm   Patient Position: Sitting   Cuff Size: Large Adult   Pulse: 91   Resp: 14   Temp: 98.1 °F (36.7 °C)   TempSrc: Oral   SpO2: 99%   Weight: 73.1 kg (161 lb 3.2 oz)   Height: 161.3 cm (63.5\")   PainSc:   1   PainLoc: Generalized       Patient's Body mass index is 28.11 kg/m². BMI is above normal parameters. Recommendations include: exercise counseling and nutrition counseling.      Assessment/Plan   Patient Self-Management and Personalized Health Advice  The patient has been provided with information about: diet, exercise, weight management, designing advance directives and supplements and preventive services including:   · Annual Wellness Visit (AWV)  · Bone Density Measurements  · Diabetes Screening-Lab Order for either glucose quantitative blood (except reagent strip), glucose;post glucose dose(includes glucose), or glucose tolerance test-3 specimens(includes glucose)  · Screening Mammography .    Visit Diagnoses:    ICD-10-CM ICD-9-CM   1. Medicare annual wellness visit, subsequent Z00.00 V70.0   2. Mixed hyperlipidemia E78.2 272.2   3. Post-menopausal Z78.0 V49.81   4. Encounter for screening mammogram for malignant neoplasm of breast Z12.31 V76.12   5. Obesity (BMI 30-39.9) E66.9 278.00   6. IFG (impaired fasting glucose) R73.01 790.21     She has increased blood sugar on her labs and signs of pre-diabetes. Discussed exercise and diet. Discussed that she needs to get more activity in even though she is busy with grand babies.Spent greater than 50% of 25 minutes in counseling in addition " to AWV regarding her weight and IFG and how they relate.     Mammogram and DEXA scan ordered.     Will continue on her Crestor for now. Blood work is stable.       Orders Placed This Encounter   Procedures   • DEXA Bone Density Axial     Order Specific Question:   Reason for Exam:     Answer:   psot-menopausal   • Mammo Screening Bilateral With CAD     Order Specific Question:   Reason for Exam:     Answer:   screening       Outpatient Encounter Medications as of 11/19/2019   Medication Sig Dispense Refill   • acetaminophen (TYLENOL) 650 MG 8 hr tablet Take 2 tablets by mouth.     • Apple Keron Vn-Grn Tea-Bit Or-Cr (APPLE CIDER VINEGAR PLUS PO) Take  by mouth.     • Cholecalciferol (VITAMIN D3) 2000 UNITS tablet Take 2,000 Units by mouth daily.     • loratadine (CLARITIN) 10 MG tablet Take  by mouth daily.     • mometasone (NASONEX) 50 MCG/ACT nasal spray into each nostril daily.     • Omega-3 Fatty Acids (FISH OIL) 1000 MG capsule capsule Take  by mouth daily.     • rosuvastatin (CRESTOR) 10 MG tablet Take 1 tablet by mouth Daily. 90 tablet 3   • [DISCONTINUED] ascorbic acid (VITAMIN C) 500 MG tablet Take 500 mg by mouth daily.     • [DISCONTINUED] ASPIRIN EC LO-DOSE PO Take 81 mg by mouth daily.     • [DISCONTINUED] Cyanocobalamin (VITAMIN B 12 PO) Take  by mouth daily.     • [DISCONTINUED] diphenhydrAMINE (BENADRYL) 25 MG tablet Take 2 tablets by mouth at night as needed.     • [DISCONTINUED] docusate sodium (COLACE) 100 MG capsule Take 300 mg by mouth every night.     • [DISCONTINUED] Multiple Vitamins-Minerals (CENTRUM SILVER PO) Take  by mouth daily.     • [DISCONTINUED] omeprazole (priLOSEC) 20 MG capsule Take 1 capsule by mouth Daily. 90 capsule 3   • [DISCONTINUED] rosuvastatin (CRESTOR) 10 MG tablet TAKE 1 TABLET DAILY 30 tablet 0   • [DISCONTINUED] vitamin E 100 UNIT capsule Take  by mouth.       No facility-administered encounter medications on file as of 11/19/2019.        Reviewed use of high risk  medication in the elderly: yes  Reviewed for potential of harmful drug interactions in the elderly: yes    Follow Up:  Return in about 1 year (around 11/19/2020) for Recheck, Medicare Wellness.     An After Visit Summary and PPPS with all of these plans were given to the patient.

## 2019-12-02 ENCOUNTER — TRANSCRIBE ORDERS (OUTPATIENT)
Dept: ADMINISTRATIVE | Facility: HOSPITAL | Age: 69
End: 2019-12-02

## 2019-12-02 ENCOUNTER — TRANSCRIBE ORDERS (OUTPATIENT)
Dept: INTERNAL MEDICINE | Facility: CLINIC | Age: 69
End: 2019-12-02

## 2019-12-02 DIAGNOSIS — Z12.31 VISIT FOR SCREENING MAMMOGRAM: Primary | ICD-10-CM

## 2019-12-16 RX ORDER — OMEPRAZOLE 20 MG/1
20 CAPSULE, DELAYED RELEASE ORAL DAILY
Qty: 90 CAPSULE | Refills: 3 | Status: SHIPPED | OUTPATIENT
Start: 2019-12-16 | End: 2020-11-20

## 2019-12-19 ENCOUNTER — HOSPITAL ENCOUNTER (OUTPATIENT)
Dept: MAMMOGRAPHY | Facility: HOSPITAL | Age: 69
Discharge: HOME | End: 2019-12-19
Admitting: INTERNAL MEDICINE

## 2019-12-19 ENCOUNTER — APPOINTMENT (OUTPATIENT)
Dept: BONE DENSITY | Facility: HOSPITAL | Age: 69
End: 2019-12-19

## 2019-12-19 DIAGNOSIS — Z12.31 VISIT FOR SCREENING MAMMOGRAM: ICD-10-CM

## 2019-12-19 PROCEDURE — 77067 SCR MAMMO BI INCL CAD: CPT

## 2019-12-19 PROCEDURE — 77063 BREAST TOMOSYNTHESIS BI: CPT

## 2019-12-19 PROCEDURE — 77080 DXA BONE DENSITY AXIAL: CPT

## 2020-11-03 DIAGNOSIS — E78.2 MIXED HYPERLIPIDEMIA: ICD-10-CM

## 2020-11-04 RX ORDER — ROSUVASTATIN CALCIUM 10 MG/1
TABLET, COATED ORAL
Qty: 90 TABLET | Refills: 0 | Status: SHIPPED | OUTPATIENT
Start: 2020-11-04 | End: 2021-01-12

## 2020-11-20 ENCOUNTER — OFFICE VISIT (OUTPATIENT)
Dept: INTERNAL MEDICINE | Facility: CLINIC | Age: 70
End: 2020-11-20

## 2020-11-20 VITALS
DIASTOLIC BLOOD PRESSURE: 78 MMHG | HEIGHT: 63 IN | RESPIRATION RATE: 18 BRPM | BODY MASS INDEX: 28.28 KG/M2 | WEIGHT: 159.6 LBS | OXYGEN SATURATION: 100 % | SYSTOLIC BLOOD PRESSURE: 120 MMHG | HEART RATE: 87 BPM | TEMPERATURE: 97.3 F

## 2020-11-20 DIAGNOSIS — Z12.31 ENCOUNTER FOR SCREENING MAMMOGRAM FOR MALIGNANT NEOPLASM OF BREAST: ICD-10-CM

## 2020-11-20 DIAGNOSIS — E78.2 MIXED HYPERLIPIDEMIA: ICD-10-CM

## 2020-11-20 DIAGNOSIS — Z00.00 MEDICARE ANNUAL WELLNESS VISIT, SUBSEQUENT: Primary | ICD-10-CM

## 2020-11-20 DIAGNOSIS — R73.01 IFG (IMPAIRED FASTING GLUCOSE): ICD-10-CM

## 2020-11-20 PROCEDURE — G0439 PPPS, SUBSEQ VISIT: HCPCS | Performed by: NURSE PRACTITIONER

## 2020-11-20 PROCEDURE — 99213 OFFICE O/P EST LOW 20 MIN: CPT | Performed by: NURSE PRACTITIONER

## 2020-11-20 RX ORDER — OMEPRAZOLE 20 MG/1
20 CAPSULE, DELAYED RELEASE ORAL DAILY PRN
Qty: 90 CAPSULE | Refills: 3
Start: 2020-11-20 | End: 2021-01-04

## 2020-11-20 NOTE — PROGRESS NOTES
"The ABCs of the Annual Wellness Visit  Subsequent Medicare Wellness Visit    Chief Complaint   Patient presents with   • Medicare Wellness-subsequent     last one 11/19/19    • Establish Care       Subjective   History of Present Illness:  Freda Castañeda is a 70 y.o. female who presents for a Subsequent Medicare Wellness Visit.    She is a new patient to me to establish care. She is a transfer from Dr. Moss. She has a history of Hyperlipidemia, IFG, and GERD. She does walk every day. She takes Crestor nightly for hyperlipidemia. She denies any myalgias from this. No history of heart disease.     She was not aware of any IFG, and has never had any glucose issues that she is aware of. She does not monitor her diet.     She is taking OTC omeprazole as needed if she eats something the \"disagress with me.\" She uses this only as needed. Ilya any abd pain.       HEALTH RISK ASSESSMENT    Recent Hospitalizations:  No hospitalization(s) within the last year.    Current Medical Providers:  Patient Care Team:  Berenice Morton APRN as PCP - General (Family Medicine)    Smoking Status:  Social History     Tobacco Use   Smoking Status Never Smoker   Smokeless Tobacco Never Used       Alcohol Consumption:  Social History     Substance and Sexual Activity   Alcohol Use No       Depression Screen:   PHQ-2/PHQ-9 Depression Screening 11/20/2020   Little interest or pleasure in doing things 0   Feeling down, depressed, or hopeless 0   Total Score 0       Fall Risk Screen:  STEADI Fall Risk Assessment has not been completed.    Health Habits and Functional and Cognitive Screening:  Functional & Cognitive Status 11/19/2019   Do you have difficulty preparing food and eating? No   Do you have difficulty bathing yourself, getting dressed or grooming yourself? No   Do you have difficulty using the toilet? No   Do you have difficulty moving around from place to place? No   Do you have trouble with steps or getting out of a bed or a chair? " No   Current Diet Limited Junk Food   Dental Exam Up to date   Eye Exam Up to date   Exercise (times per week) 7 times per week   Current Exercise Activities Include Walking   Do you need help using the phone?  No   Are you deaf or do you have serious difficulty hearing?  No   Do you need help with transportation? No   Do you need help shopping? No   Do you need help preparing meals?  No   Do you need help with housework?  No   Do you need help with laundry? No   Do you need help taking your medications? No   Do you need help managing money? No   Do you ever drive or ride in a car without wearing a seat belt? No   Have you felt unusual stress, anger or loneliness in the last month? No   Who do you live with? Spouse   If you need help, do you have trouble finding someone available to you? No   Have you been bothered in the last four weeks by sexual problems? No   Do you have difficulty concentrating, remembering or making decisions? No         Does the patient have evidence of cognitive impairment? No    Asprin use counseling:Does not need ASA (and currently is not on it)    Age-appropriate Screening Schedule:  Refer to the list below for future screening recommendations based on patient's age, sex and/or medical conditions. Orders for these recommended tests are listed in the plan section. The patient has been provided with a written plan.    Health Maintenance   Topic Date Due   • PAP SMEAR  07/08/2018   • INFLUENZA VACCINE  08/01/2020   • LIPID PANEL  11/12/2020   • ZOSTER VACCINE (2 of 2) 11/27/2020 (Originally 5/18/2017)   • MAMMOGRAM  12/19/2021   • COLONOSCOPY  11/02/2023   • TDAP/TD VACCINES (2 - Td) 03/23/2027          The following portions of the patient's history were reviewed and updated as appropriate: allergies, current medications, past family history, past medical history, past social history, past surgical history and problem list.    Outpatient Medications Prior to Visit   Medication Sig Dispense  Refill   • acetaminophen (TYLENOL) 650 MG 8 hr tablet Take 2 tablets by mouth.     • Apple Keron Vn-Grn Tea-Bit Or-Cr (APPLE CIDER VINEGAR PLUS PO) Take  by mouth.     • Cholecalciferol (VITAMIN D3) 2000 UNITS tablet Take 2,000 Units by mouth daily.     • loratadine (CLARITIN) 10 MG tablet Take  by mouth daily.     • mometasone (NASONEX) 50 MCG/ACT nasal spray into each nostril daily.     • Omega-3 Fatty Acids (FISH OIL) 1000 MG capsule capsule Take  by mouth daily.     • omeprazole (priLOSEC) 20 MG capsule Take 1 capsule by mouth Daily. 90 capsule 3   • rosuvastatin (CRESTOR) 10 MG tablet TAKE 1 TABLET DAILY 90 tablet 0     No facility-administered medications prior to visit.        Patient Active Problem List   Diagnosis   • Hay fever   • Arthritis   • Hematochezia   • Gastroesophageal reflux disease   • Hyperlipidemia   • Polyarteritis nodosa (CMS/HCC)   • Arthritis of temporomandibular joint   • Menopause   • Atypical chest pain   • Medicare annual wellness visit, subsequent   • Obesity (BMI 30-39.9)   • IFG (impaired fasting glucose)       Advanced Care Planning:  ACP discussion was held with the patient during this visit. Patient does not have an advance directive, information provided.    Review of Systems   Constitutional: Negative.    HENT: Negative.    Eyes: Negative.    Respiratory: Negative.    Cardiovascular: Negative.  Negative for chest pain, palpitations and leg swelling.   Gastrointestinal: Negative.    Endocrine: Negative.    Genitourinary: Negative.    Musculoskeletal: Negative.    Skin: Negative.    Allergic/Immunologic: Negative.    Neurological: Negative.    Hematological: Negative.    Psychiatric/Behavioral: Negative.    All other systems reviewed and are negative.      Compared to one year ago, the patient feels her physical health is the same.  Compared to one year ago, the patient feels her mental health is the same.    Reviewed chart for potential of high risk medication in the elderly:  "no  Reviewed chart for potential of harmful drug interactions in the elderly:yes    Objective         Vitals:    11/20/20 0955   Weight: 72.4 kg (159 lb 9.6 oz)   Height: 160 cm (62.99\")   PainSc:   5   PainLoc: Arm  Comment: arms bilateral       Body mass index is 28.28 kg/m².  Discussed the patient's BMI with her. The BMI is in the acceptable range.    Physical Exam  Vitals signs reviewed.   Constitutional:       Appearance: Normal appearance. She is not ill-appearing.   HENT:      Right Ear: Tympanic membrane and ear canal normal.      Left Ear: Tympanic membrane and ear canal normal.      Nose: No congestion or rhinorrhea.      Mouth/Throat:      Pharynx: No oropharyngeal exudate or posterior oropharyngeal erythema.   Neck:      Musculoskeletal: Normal range of motion.   Cardiovascular:      Rate and Rhythm: Normal rate and regular rhythm.      Pulses: Normal pulses.      Heart sounds: No murmur.   Pulmonary:      Effort: Pulmonary effort is normal. No respiratory distress.      Breath sounds: Normal breath sounds. No stridor. No wheezing or rhonchi.   Abdominal:      General: Abdomen is flat. There is no distension.      Palpations: Abdomen is soft.      Tenderness: There is no abdominal tenderness.   Neurological:      Mental Status: She is alert.   Psychiatric:         Mood and Affect: Mood normal.         Thought Content: Thought content normal.               Assessment/Plan   Medicare Risks and Personalized Health Plan  CMS Preventative Services Quick Reference  Advance Directive Discussion  Breast Cancer/Mammogram Screening  Cardiovascular risk  Colon Cancer Screening  Depression/Dysphoria  Diabetic Lab Screening   Fall Risk  Glaucoma Risk  Obesity/Overweight   Osteoprorosis Risk    The above risks/problems have been discussed with the patient.  Pertinent information has been shared with the patient in the After Visit Summary.  Follow up plans and orders are seen below in the Assessment/Plan " Section.    Diagnoses and all orders for this visit:    1. Medicare annual wellness visit, subsequent (Primary)    2. Mixed hyperlipidemia  -     Comprehensive metabolic panel  -     Conv Lipid Panel w/ Chol/HDL Ratio  -     CBC w AUTO Differential  -     Hemoglobin A1c    3. IFG (impaired fasting glucose)  -     Comprehensive metabolic panel  -     Hemoglobin A1c      Will update labs today.      Follow Up:    1 year for AWV    An After Visit Summary and PPPS were given to the patient.

## 2020-11-20 NOTE — PROGRESS NOTES
Hyperlipidemia    Subjective     Freda Castañeda is a 70 y.o. female being seen for a follow up appointment today regarding  ***. She ***.       History of Present Illness     No Known Allergies      Current Outpatient Medications:   •  acetaminophen (TYLENOL) 650 MG 8 hr tablet, Take 2 tablets by mouth., Disp: , Rfl:   •  Apple Keron Vn-Grn Tea-Bit Or-Cr (APPLE CIDER VINEGAR PLUS PO), Take  by mouth., Disp: , Rfl:   •  Cholecalciferol (VITAMIN D3) 2000 UNITS tablet, Take 2,000 Units by mouth daily., Disp: , Rfl:   •  loratadine (CLARITIN) 10 MG tablet, Take  by mouth daily., Disp: , Rfl:   •  mometasone (NASONEX) 50 MCG/ACT nasal spray, into each nostril daily., Disp: , Rfl:   •  Omega-3 Fatty Acids (FISH OIL) 1000 MG capsule capsule, Take  by mouth daily., Disp: , Rfl:   •  omeprazole (priLOSEC) 20 MG capsule, Take 1 capsule by mouth Daily., Disp: 90 capsule, Rfl: 3  •  rosuvastatin (CRESTOR) 10 MG tablet, TAKE 1 TABLET DAILY, Disp: 90 tablet, Rfl: 0    {Common H&P Review Areas:01436}    Review of Systems    Assessment     Physical Exam    Plan     Her fasting labs were reviewed with the patient from last week.     {Assess/Plan SmartLinks:93481}    No follow-ups on file.

## 2020-11-20 NOTE — PATIENT INSTRUCTIONS
Advance Directive    Advance directives are legal documents that let you make choices ahead of time about your health care and medical treatment in case you become unable to communicate for yourself. Advance directives are a way for you to make known your wishes to family, friends, and health care providers. This can let others know about your end-of-life care if you become unable to communicate.  Discussing and writing advance directives should happen over time rather than all at once. Advance directives can be changed depending on your situation and what you want, even after you have signed the advance directives.  There are different types of advance directives, such as:  · Medical power of .  · Living will.  · Do not resuscitate (DNR) or do not attempt resuscitation (DNAR) order.  Health care proxy and medical power of   A health care proxy is also called a health care agent. This is a person who is appointed to make medical decisions for you in cases where you are unable to make the decisions yourself. Generally, people choose someone they know well and trust to represent their preferences. Make sure to ask this person for an agreement to act as your proxy. A proxy may have to exercise judgment in the event of a medical decision for which your wishes are not known.  A medical power of  is a legal document that names your health care proxy. Depending on the laws in your state, after the document is written, it may also need to be:  · Signed.  · Notarized.  · Dated.  · Copied.  · Witnessed.  · Incorporated into your medical record.  You may also want to appoint someone to manage your money in a situation in which you are unable to do so. This is called a durable power of  for finances. It is a separate legal document from the durable power of  for health care. You may choose the same person or someone different from your health care proxy to act as your agent in money  matters.  If you do not appoint a proxy, or if there is a concern that the proxy is not acting in your best interests, a court may appoint a guardian to act on your behalf.  Living will  A living will is a set of instructions that state your wishes about medical care when you cannot express them yourself. Health care providers should keep a copy of your living will in your medical record. You may want to give a copy to family members or friends. To alert caregivers in case of an emergency, you can place a card in your wallet to let them know that you have a living will and where they can find it. A living will is used if you become:  · Terminally ill.  · Disabled.  · Unable to communicate or make decisions.  Items to consider in your living will include:  · To use or not to use life-support equipment, such as dialysis machines and breathing machines (ventilators).  · A DNR or DNAR order. This tells health care providers not to use cardiopulmonary resuscitation (CPR) if breathing or heartbeat stops.  · To use or not to use tube feeding.  · To be given or not to be given food and fluids.  · Comfort (palliative) care when the goal becomes comfort rather than a cure.  · Donation of organs and tissues.  A living will does not give instructions for distributing your money and property if you should pass away.  DNR or DNAR  A DNR or DNAR order is a request not to have CPR in the event that your heart stops beating or you stop breathing. If a DNR or DNAR order has not been made and shared, a health care provider will try to help any patient whose heart has stopped or who has stopped breathing. If you plan to have surgery, talk with your health care provider about how your DNR or DNAR order will be followed if problems occur.  What if I do not have an advance directive?  If you do not have an advance directive, some states assign family decision makers to act on your behalf based on how closely you are related to them. Each  state has its own laws about advance directives. You may want to check with your health care provider, , or state representative about the laws in your state.  Summary  · Advance directives are the legal documents that allow you to make choices ahead of time about your health care and medical treatment in case you become unable to tell others about your care.  · The process of discussing and writing advance directives should happen over time. You can change the advance directives, even after you have signed them.  · Advance directives include DNR or DNAR orders, living purvis, and designating an agent as your medical power of .  This information is not intended to replace advice given to you by your health care provider. Make sure you discuss any questions you have with your health care provider.  Document Released: 03/26/2009 Document Revised: 07/16/2020 Document Reviewed: 07/16/2020  Elsevier Patient Education © 2020 [employer]

## 2020-11-21 LAB
ALBUMIN SERPL-MCNC: 4.6 G/DL (ref 3.5–5.2)
ALBUMIN/GLOB SERPL: 2.6 G/DL
ALP SERPL-CCNC: 80 U/L (ref 39–117)
ALT SERPL-CCNC: 12 U/L (ref 1–33)
AST SERPL-CCNC: 14 U/L (ref 1–32)
BASOPHILS # BLD AUTO: 0.04 10*3/MM3 (ref 0–0.2)
BASOPHILS NFR BLD AUTO: 0.8 % (ref 0–1.5)
BILIRUB SERPL-MCNC: 1.1 MG/DL (ref 0–1.2)
BUN SERPL-MCNC: 8 MG/DL (ref 8–23)
BUN/CREAT SERPL: 16.7 (ref 7–25)
CALCIUM SERPL-MCNC: 9.3 MG/DL (ref 8.6–10.5)
CHLORIDE SERPL-SCNC: 105 MMOL/L (ref 98–107)
CHOLEST SERPL-MCNC: 143 MG/DL (ref 0–200)
CHOLEST/HDLC SERPL: 3.11 {RATIO}
CO2 SERPL-SCNC: 28.6 MMOL/L (ref 22–29)
CREAT SERPL-MCNC: 0.48 MG/DL (ref 0.57–1)
EOSINOPHIL # BLD AUTO: 0.06 10*3/MM3 (ref 0–0.4)
EOSINOPHIL NFR BLD AUTO: 1.1 % (ref 0.3–6.2)
ERYTHROCYTE [DISTWIDTH] IN BLOOD BY AUTOMATED COUNT: 12.5 % (ref 12.3–15.4)
GLOBULIN SER CALC-MCNC: 1.8 GM/DL
GLUCOSE SERPL-MCNC: 89 MG/DL (ref 65–99)
HBA1C MFR BLD: 5.6 % (ref 4.8–5.6)
HCT VFR BLD AUTO: 40.9 % (ref 34–46.6)
HDLC SERPL-MCNC: 46 MG/DL (ref 40–60)
HGB BLD-MCNC: 13.6 G/DL (ref 12–15.9)
IMM GRANULOCYTES # BLD AUTO: 0.01 10*3/MM3 (ref 0–0.05)
IMM GRANULOCYTES NFR BLD AUTO: 0.2 % (ref 0–0.5)
LDLC SERPL CALC-MCNC: 65 MG/DL (ref 0–100)
LYMPHOCYTES # BLD AUTO: 1.72 10*3/MM3 (ref 0.7–3.1)
LYMPHOCYTES NFR BLD AUTO: 32.7 % (ref 19.6–45.3)
MCH RBC QN AUTO: 31.2 PG (ref 26.6–33)
MCHC RBC AUTO-ENTMCNC: 33.3 G/DL (ref 31.5–35.7)
MCV RBC AUTO: 93.8 FL (ref 79–97)
MONOCYTES # BLD AUTO: 0.46 10*3/MM3 (ref 0.1–0.9)
MONOCYTES NFR BLD AUTO: 8.7 % (ref 5–12)
NEUTROPHILS # BLD AUTO: 2.97 10*3/MM3 (ref 1.7–7)
NEUTROPHILS NFR BLD AUTO: 56.5 % (ref 42.7–76)
NRBC BLD AUTO-RTO: 0 /100 WBC (ref 0–0.2)
PLATELET # BLD AUTO: 290 10*3/MM3 (ref 140–450)
POTASSIUM SERPL-SCNC: 5.2 MMOL/L (ref 3.5–5.2)
PROT SERPL-MCNC: 6.4 G/DL (ref 6–8.5)
RBC # BLD AUTO: 4.36 10*6/MM3 (ref 3.77–5.28)
SODIUM SERPL-SCNC: 141 MMOL/L (ref 136–145)
TRIGL SERPL-MCNC: 196 MG/DL (ref 0–150)
VLDLC SERPL CALC-MCNC: 32 MG/DL (ref 5–40)
WBC # BLD AUTO: 5.26 10*3/MM3 (ref 3.4–10.8)

## 2020-12-01 ENCOUNTER — TRANSCRIBE ORDERS (OUTPATIENT)
Dept: ADMINISTRATIVE | Facility: HOSPITAL | Age: 70
End: 2020-12-01

## 2020-12-01 DIAGNOSIS — Z12.31 ENCOUNTER FOR SCREENING MAMMOGRAM FOR MALIGNANT NEOPLASM OF BREAST: Primary | ICD-10-CM

## 2020-12-30 ENCOUNTER — APPOINTMENT (OUTPATIENT)
Dept: MAMMOGRAPHY | Facility: HOSPITAL | Age: 70
End: 2020-12-30

## 2021-01-04 RX ORDER — OMEPRAZOLE 20 MG/1
CAPSULE, DELAYED RELEASE ORAL
Qty: 90 CAPSULE | Refills: 3 | Status: SHIPPED | OUTPATIENT
Start: 2021-01-04 | End: 2021-11-23

## 2021-01-12 DIAGNOSIS — E78.2 MIXED HYPERLIPIDEMIA: ICD-10-CM

## 2021-01-12 RX ORDER — ROSUVASTATIN CALCIUM 10 MG/1
TABLET, COATED ORAL
Qty: 90 TABLET | Refills: 3 | Status: SHIPPED | OUTPATIENT
Start: 2021-01-12 | End: 2021-11-23 | Stop reason: SDUPTHER

## 2021-01-14 ENCOUNTER — HOSPITAL ENCOUNTER (OUTPATIENT)
Dept: MAMMOGRAPHY | Facility: HOSPITAL | Age: 71
Discharge: HOME | End: 2021-01-14
Admitting: INTERNAL MEDICINE

## 2021-01-14 DIAGNOSIS — Z12.31 ENCOUNTER FOR SCREENING MAMMOGRAM FOR MALIGNANT NEOPLASM OF BREAST: ICD-10-CM

## 2021-01-14 PROCEDURE — 77067 SCR MAMMO BI INCL CAD: CPT

## 2021-01-14 PROCEDURE — 77063 BREAST TOMOSYNTHESIS BI: CPT

## 2021-11-10 ENCOUNTER — TELEPHONE (OUTPATIENT)
Dept: INTERNAL MEDICINE | Facility: CLINIC | Age: 71
End: 2021-11-10

## 2021-11-15 DIAGNOSIS — E78.2 MIXED HYPERLIPIDEMIA: Primary | ICD-10-CM

## 2021-11-15 DIAGNOSIS — K21.00 GASTROESOPHAGEAL REFLUX DISEASE WITH ESOPHAGITIS WITHOUT HEMORRHAGE: ICD-10-CM

## 2021-11-15 DIAGNOSIS — R73.01 IFG (IMPAIRED FASTING GLUCOSE): ICD-10-CM

## 2021-11-16 ENCOUNTER — LAB (OUTPATIENT)
Dept: INTERNAL MEDICINE | Facility: CLINIC | Age: 71
End: 2021-11-16

## 2021-11-16 DIAGNOSIS — R73.01 IFG (IMPAIRED FASTING GLUCOSE): ICD-10-CM

## 2021-11-16 DIAGNOSIS — E78.2 MIXED HYPERLIPIDEMIA: ICD-10-CM

## 2021-11-16 DIAGNOSIS — K21.00 GASTROESOPHAGEAL REFLUX DISEASE WITH ESOPHAGITIS WITHOUT HEMORRHAGE: ICD-10-CM

## 2021-11-17 LAB
ALBUMIN SERPL-MCNC: 4.7 G/DL (ref 3.8–4.8)
ALBUMIN/GLOB SERPL: 2.6 {RATIO} (ref 1.2–2.2)
ALP SERPL-CCNC: 82 IU/L (ref 44–121)
ALT SERPL-CCNC: 20 IU/L (ref 0–32)
AST SERPL-CCNC: 20 IU/L (ref 0–40)
BILIRUB SERPL-MCNC: 1.1 MG/DL (ref 0–1.2)
BUN SERPL-MCNC: 12 MG/DL (ref 8–27)
BUN/CREAT SERPL: 20 (ref 12–28)
CALCIUM SERPL-MCNC: 9.6 MG/DL (ref 8.7–10.3)
CHLORIDE SERPL-SCNC: 103 MMOL/L (ref 96–106)
CHOLEST SERPL-MCNC: 151 MG/DL (ref 100–199)
CHOLEST/HDLC SERPL: 3.8 RATIO (ref 0–4.4)
CO2 SERPL-SCNC: 22 MMOL/L (ref 20–29)
CREAT SERPL-MCNC: 0.59 MG/DL (ref 0.57–1)
ERYTHROCYTE [DISTWIDTH] IN BLOOD BY AUTOMATED COUNT: 12.7 % (ref 11.7–15.4)
GLOBULIN SER CALC-MCNC: 1.8 G/DL (ref 1.5–4.5)
GLUCOSE SERPL-MCNC: 98 MG/DL (ref 65–99)
HBA1C MFR BLD: 5.8 % (ref 4.8–5.6)
HCT VFR BLD AUTO: 41.9 % (ref 34–46.6)
HDLC SERPL-MCNC: 40 MG/DL
HGB BLD-MCNC: 14.1 G/DL (ref 11.1–15.9)
LDLC SERPL CALC-MCNC: 80 MG/DL (ref 0–99)
MCH RBC QN AUTO: 31.5 PG (ref 26.6–33)
MCHC RBC AUTO-ENTMCNC: 33.7 G/DL (ref 31.5–35.7)
MCV RBC AUTO: 94 FL (ref 79–97)
PLATELET # BLD AUTO: 288 X10E3/UL (ref 150–450)
POTASSIUM SERPL-SCNC: 4.9 MMOL/L (ref 3.5–5.2)
PROT SERPL-MCNC: 6.5 G/DL (ref 6–8.5)
RBC # BLD AUTO: 4.48 X10E6/UL (ref 3.77–5.28)
SODIUM SERPL-SCNC: 139 MMOL/L (ref 134–144)
TRIGL SERPL-MCNC: 184 MG/DL (ref 0–149)
VLDLC SERPL CALC-MCNC: 31 MG/DL (ref 5–40)
WBC # BLD AUTO: 5.2 X10E3/UL (ref 3.4–10.8)

## 2021-11-23 ENCOUNTER — OFFICE VISIT (OUTPATIENT)
Dept: INTERNAL MEDICINE | Facility: CLINIC | Age: 71
End: 2021-11-23

## 2021-11-23 VITALS
BODY MASS INDEX: 29.3 KG/M2 | RESPIRATION RATE: 20 BRPM | WEIGHT: 165.4 LBS | TEMPERATURE: 97.8 F | SYSTOLIC BLOOD PRESSURE: 124 MMHG | HEIGHT: 63 IN | OXYGEN SATURATION: 98 % | HEART RATE: 79 BPM | DIASTOLIC BLOOD PRESSURE: 76 MMHG

## 2021-11-23 DIAGNOSIS — K21.9 GASTROESOPHAGEAL REFLUX DISEASE WITHOUT ESOPHAGITIS: ICD-10-CM

## 2021-11-23 DIAGNOSIS — Z00.00 ENCOUNTER FOR ANNUAL WELLNESS VISIT (AWV) IN MEDICARE PATIENT: Primary | ICD-10-CM

## 2021-11-23 DIAGNOSIS — M19.90 ARTHRITIS: ICD-10-CM

## 2021-11-23 DIAGNOSIS — E78.2 MIXED HYPERLIPIDEMIA: ICD-10-CM

## 2021-11-23 PROBLEM — R07.89 ATYPICAL CHEST PAIN: Status: RESOLVED | Noted: 2018-11-15 | Resolved: 2021-11-23

## 2021-11-23 PROCEDURE — G0439 PPPS, SUBSEQ VISIT: HCPCS | Performed by: NURSE PRACTITIONER

## 2021-11-23 PROCEDURE — 99213 OFFICE O/P EST LOW 20 MIN: CPT | Performed by: NURSE PRACTITIONER

## 2021-11-23 PROCEDURE — 1126F AMNT PAIN NOTED NONE PRSNT: CPT | Performed by: NURSE PRACTITIONER

## 2021-11-23 PROCEDURE — 1160F RVW MEDS BY RX/DR IN RCRD: CPT | Performed by: NURSE PRACTITIONER

## 2021-11-23 PROCEDURE — 1170F FXNL STATUS ASSESSED: CPT | Performed by: NURSE PRACTITIONER

## 2021-11-23 RX ORDER — ROSUVASTATIN CALCIUM 10 MG/1
10 TABLET, COATED ORAL DAILY
Qty: 90 TABLET | Refills: 3 | Status: SHIPPED | OUTPATIENT
Start: 2021-11-23 | End: 2023-01-18

## 2021-11-23 NOTE — PATIENT INSTRUCTIONS
Advance Directive    Advance directives are legal documents that let you make choices ahead of time about your health care and medical treatment in case you become unable to communicate for yourself. Advance directives are a way for you to make known your wishes to family, friends, and health care providers. This can let others know about your end-of-life care if you become unable to communicate.  Discussing and writing advance directives should happen over time rather than all at once. Advance directives can be changed depending on your situation and what you want, even after you have signed the advance directives.  There are different types of advance directives, such as:  · Medical power of .  · Living will.  · Do not resuscitate (DNR) or do not attempt resuscitation (DNAR) order.  Health care proxy and medical power of   A health care proxy is also called a health care agent. This is a person who is appointed to make medical decisions for you in cases where you are unable to make the decisions yourself. Generally, people choose someone they know well and trust to represent their preferences. Make sure to ask this person for an agreement to act as your proxy. A proxy may have to exercise judgment in the event of a medical decision for which your wishes are not known.  A medical power of  is a legal document that names your health care proxy. Depending on the laws in your state, after the document is written, it may also need to be:  · Signed.  · Notarized.  · Dated.  · Copied.  · Witnessed.  · Incorporated into your medical record.  You may also want to appoint someone to manage your money in a situation in which you are unable to do so. This is called a durable power of  for finances. It is a separate legal document from the durable power of  for health care. You may choose the same person or someone different from your health care proxy to act as your agent in money  matters.  If you do not appoint a proxy, or if there is a concern that the proxy is not acting in your best interests, a court may appoint a guardian to act on your behalf.  Living will  A living will is a set of instructions that state your wishes about medical care when you cannot express them yourself. Health care providers should keep a copy of your living will in your medical record. You may want to give a copy to family members or friends. To alert caregivers in case of an emergency, you can place a card in your wallet to let them know that you have a living will and where they can find it. A living will is used if you become:  · Terminally ill.  · Disabled.  · Unable to communicate or make decisions.  Items to consider in your living will include:  · To use or not to use life-support equipment, such as dialysis machines and breathing machines (ventilators).  · A DNR or DNAR order. This tells health care providers not to use cardiopulmonary resuscitation (CPR) if breathing or heartbeat stops.  · To use or not to use tube feeding.  · To be given or not to be given food and fluids.  · Comfort (palliative) care when the goal becomes comfort rather than a cure.  · Donation of organs and tissues.  A living will does not give instructions for distributing your money and property if you should pass away.  DNR or DNAR  A DNR or DNAR order is a request not to have CPR in the event that your heart stops beating or you stop breathing. If a DNR or DNAR order has not been made and shared, a health care provider will try to help any patient whose heart has stopped or who has stopped breathing. If you plan to have surgery, talk with your health care provider about how your DNR or DNAR order will be followed if problems occur.  What if I do not have an advance directive?  If you do not have an advance directive, some states assign family decision makers to act on your behalf based on how closely you are related to them. Each  state has its own laws about advance directives. You may want to check with your health care provider, , or state representative about the laws in your state.  Summary  · Advance directives are the legal documents that allow you to make choices ahead of time about your health care and medical treatment in case you become unable to tell others about your care.  · The process of discussing and writing advance directives should happen over time. You can change the advance directives, even after you have signed them.  · Advance directives include DNR or DNAR orders, living purvis, and designating an agent as your medical power of .  This information is not intended to replace advice given to you by your health care provider. Make sure you discuss any questions you have with your health care provider.  Document Revised: 01/28/2021 Document Reviewed: 07/16/2020  [employer]vier Patient Education © 2021 [employer]

## 2021-11-23 NOTE — PROGRESS NOTES
The ABCs of the Annual Wellness Visit  Subsequent Medicare Wellness Visit    Chief Complaint   Patient presents with   • Medicare Wellness-subsequent      Subjective    History of Present Illness:  Freda Castañeda is a 71 y.o. female who presents for a Subsequent Medicare Wellness Visit.    She will also need a F/U on of Hyperlipidemia, IFG, and GERD. She has quit walking every day due to myalgias and knee pain. She takes Crestor 10mg nightly for hyperlipidemia. No history of heart disease.      She was not aware of any IFG, and has never had any glucose issues that she is aware of. She does not monitor her diet.      She is taking OTC Tagamet as needed, she stopped Omeprazole 20mg on her own.  She uses this only as needed. Ilya any abd pain or reflux at this time.        The following portions of the patient's history were reviewed and   updated as appropriate: allergies, current medications, past family history, past medical history, past social history, past surgical history and problem list.    Compared to one year ago, the patient feels her physical   health is the same.    Compared to one year ago, the patient feels her mental   health is the same.    Recent Hospitalizations:  She was not admitted to the hospital during the last year.       Current Medical Providers:  Patient Care Team:  Berenice Morton APRN as PCP - General (Family Medicine)    Outpatient Medications Prior to Visit   Medication Sig Dispense Refill   • acetaminophen (TYLENOL) 650 MG 8 hr tablet Take 2 tablets by mouth.     • Apple Keron Vn-Grn Tea-Bit Or-Cr (APPLE CIDER VINEGAR PLUS PO) Take  by mouth.     • Cholecalciferol (VITAMIN D3) 2000 UNITS tablet Take 2,000 Units by mouth daily.     • loratadine (CLARITIN) 10 MG tablet Take  by mouth daily.     • mometasone (NASONEX) 50 MCG/ACT nasal spray into each nostril daily.     • Omega-3 Fatty Acids (FISH OIL) 1000 MG capsule capsule Take  by mouth daily.     • omeprazole (priLOSEC) 20 MG capsule  "TAKE 1 CAPSULE DAILY 90 capsule 3   • rosuvastatin (CRESTOR) 10 MG tablet TAKE 1 TABLET DAILY 90 tablet 3     No facility-administered medications prior to visit.       No opioid medication identified on active medication list. I have reviewed chart for other potential  high risk medication/s and harmful drug interactions in the elderly.          Aspirin is not on active medication list.  Aspirin use is not indicated based on review of current medical condition/s. Risk of harm outweighs potential benefits.  .    Patient Active Problem List   Diagnosis   • Hay fever   • Arthritis   • Hematochezia   • Gastroesophageal reflux disease   • Hyperlipidemia   • Polyarteritis nodosa (HCC)   • Arthritis of temporomandibular joint   • Menopause   • Encounter for screening mammogram for malignant neoplasm of breast   • Obesity (BMI 30-39.9)   • IFG (impaired fasting glucose)     Advance Care Planning  Advance Directive is not on file.  ACP discussion was held with the patient during this visit. Patient does not have an advance directive, information provided.    Review of Systems   Constitutional: Negative.    HENT: Negative.    Eyes: Negative.    Respiratory: Negative.    Cardiovascular: Negative.  Negative for chest pain, palpitations and leg swelling.   Gastrointestinal: Negative for abdominal distention and abdominal pain.   Musculoskeletal: Positive for arthralgias.   Hematological: Negative.    Psychiatric/Behavioral: Negative.    All other systems reviewed and are negative.       Objective    Vitals:    11/23/21 1112   BP: 124/76   Pulse: 79   Resp: 20   Temp: 97.8 °F (36.6 °C)   TempSrc: Temporal   SpO2: 98%   Weight: 75 kg (165 lb 6.4 oz)   Height: 160 cm (62.99\")   PainSc: 0-No pain     BMI Readings from Last 1 Encounters:   11/23/21 29.31 kg/m²   BMI is above normal parameters. Recommendations include: exercise counseling    Does the patient have evidence of cognitive impairment? No    Physical Exam  Vitals reviewed. "   Constitutional:       Appearance: Normal appearance.   HENT:      Head: Normocephalic.      Right Ear: Tympanic membrane normal.      Left Ear: Tympanic membrane normal.   Cardiovascular:      Rate and Rhythm: Normal rate and regular rhythm.      Pulses: Normal pulses.      Heart sounds: Normal heart sounds. No murmur heard.      Pulmonary:      Effort: Pulmonary effort is normal.      Breath sounds: Normal breath sounds.   Musculoskeletal:         General: No swelling.      Right knee: Swelling present.      Left knee: Swelling present. Decreased range of motion. Tenderness present over the medial joint line.      Right lower leg: No edema.   Skin:     General: Skin is warm and dry.   Neurological:      General: No focal deficit present.      Mental Status: She is alert and oriented to person, place, and time.   Psychiatric:         Mood and Affect: Mood normal.         Behavior: Behavior normal.         Thought Content: Thought content normal.       Lab Results   Component Value Date    CHLPL 151 11/16/2021    TRIG 184 (H) 11/16/2021    HDL 40 11/16/2021    LDL 80 11/16/2021    VLDL 31 11/16/2021    HGBA1C 5.8 (H) 11/16/2021            HEALTH RISK ASSESSMENT    Smoking Status:  Social History     Tobacco Use   Smoking Status Never Smoker   Smokeless Tobacco Never Used     Alcohol Consumption:  Social History     Substance and Sexual Activity   Alcohol Use No     Fall Risk Screen:    [facility], and patient is at LOW risk for falls.Assessment completed on:11/23/2021    Depression Screening:  PHQ-2/PHQ-9 Depression Screening 11/23/2021   Little interest or pleasure in doing things 0   Feeling down, depressed, or hopeless 0   Total Score 0       Health Habits and Functional and Cognitive Screening:  Functional & Cognitive Status 11/23/2021   Do you have difficulty preparing food and eating? No   Do you have difficulty bathing yourself, getting dressed or grooming yourself? No   Do you  have difficulty using the toilet? No   Do you have difficulty moving around from place to place? No   Do you have trouble with steps or getting out of a bed or a chair? Yes   Current Diet Well Balanced Diet   Dental Exam Not up to date   Eye Exam Up to date   Exercise (times per week) 3 times per week   Current Exercises Include Walking;Yard Work   Current Exercise Activities Include -   Do you need help using the phone?  No   Are you deaf or do you have serious difficulty hearing?  No   Do you need help with transportation? No   Do you need help shopping? No   Do you need help preparing meals?  No   Do you need help with housework?  No   Do you need help with laundry? No   Do you need help taking your medications? No   Do you need help managing money? No   Do you ever drive or ride in a car without wearing a seat belt? No   Have you felt unusual stress, anger or loneliness in the last month? No   Who do you live with? Spouse   If you need help, do you have trouble finding someone available to you? No   Have you been bothered in the last four weeks by sexual problems? No   Do you have difficulty concentrating, remembering or making decisions? No       Age-appropriate Screening Schedule:  Refer to the list below for future screening recommendations based on patient's age, sex and/or medical conditions. Orders for these recommended tests are listed in the plan section. The patient has been provided with a written plan.    Health Maintenance   Topic Date Due   • ZOSTER VACCINE (2 of 2) 05/18/2017   • INFLUENZA VACCINE  08/01/2021   • PAP SMEAR  04/20/2025 (Originally 7/8/2018)   • DXA SCAN  12/19/2021   • LIPID PANEL  11/16/2022   • MAMMOGRAM  01/14/2023   • TDAP/TD VACCINES (2 - Td or Tdap) 03/23/2027              Assessment/Plan   CMS Preventative Services Quick Reference  Risk Factors Identified During Encounter  Cardiovascular Disease  Chronic Pain   Immunizations Discussed/Encouraged (specific Immunizations;  Influenza  Obesity/Overweight   The above risks/problems have been discussed with the patient.  Follow up actions/plans if indicated are seen below in the Assessment/Plan Section.  Pertinent information has been shared with the patient in the After Visit Summary.     Diagnosis Plan   1. Encounter for annual wellness visit (AWV) in Medicare patient  Diclofenac Sodium (VOLTAREN) 1 % gel gel   2. Mixed hyperlipidemia  rosuvastatin (CRESTOR) 10 MG tablet    CBC (No Diff)    Comprehensive Metabolic Panel    Lipid Panel With / Chol / HDL Ratio   3. Arthritis  Diclofenac Sodium (VOLTAREN) 1 % gel gel   4. Gastroesophageal reflux disease without esophagitis  CBC (No Diff)    Comprehensive Metabolic Panel       LDL at goal on Crestor 10mg nightly, she is havoing Myalgias from this. Hold Crestor for 2 weeks, then resume at 5mg dose as tolerated.    Use Topical Diclofenac 1% gel for OA pain in knee    Follow Up:    1 year with labs     An After Visit Summary and PPPS were made available to the patient.

## 2022-02-21 ENCOUNTER — TRANSCRIBE ORDERS (OUTPATIENT)
Dept: ADMINISTRATIVE | Facility: HOSPITAL | Age: 72
End: 2022-02-21

## 2022-02-21 DIAGNOSIS — Z12.31 ENCOUNTER FOR SCREENING MAMMOGRAM FOR MALIGNANT NEOPLASM OF BREAST: Primary | ICD-10-CM

## 2022-03-04 ENCOUNTER — HOSPITAL ENCOUNTER (OUTPATIENT)
Dept: MAMMOGRAPHY | Facility: HOSPITAL | Age: 72
Discharge: HOME | End: 2022-03-04
Admitting: NURSE PRACTITIONER

## 2022-03-04 DIAGNOSIS — Z12.31 ENCOUNTER FOR SCREENING MAMMOGRAM FOR MALIGNANT NEOPLASM OF BREAST: ICD-10-CM

## 2022-03-04 PROCEDURE — 77067 SCR MAMMO BI INCL CAD: CPT

## 2022-03-04 PROCEDURE — 77063 BREAST TOMOSYNTHESIS BI: CPT

## 2022-07-29 ENCOUNTER — TELEPHONE (OUTPATIENT)
Dept: INTERNAL MEDICINE | Facility: CLINIC | Age: 72
End: 2022-07-29

## 2022-07-29 NOTE — TELEPHONE ENCOUNTER
Caller: Freda Castañeda    Relationship: Self    Best call back number: [phone] (H)      PATIENT TESTED POSITIVE FOR COVID TODAY THROUGH A HOME TEST.    CURRENT SYMPTOMS: COUGH, HEADACHE, NAUSEA, SLEEPLESSNESS, SINUS PRESSURE     SYMPTOMS BEGAN YESTERDAY 07/29/22     PATIENT WONDERING IF THERE IS ANY MEDICATION WE CAN CALL IN FOR HER.    PATIENT HAS BEEN TAKING TYLENOL AND EXCEDRIN MIGRAINE, BENADRYL FOR SLEEP (NOT WORKING)     NOTHING OVER THE COUNTER SEEMS TO BE HELPING.    [facility] [address] - [address] [facility] [address] - [phone]  - [phone] FX        PLEASE ADVISE

## 2022-07-31 NOTE — TELEPHONE ENCOUNTER
Tried to call patient to discuss what she could do but was unable to reach and left a voicemail with the details of her doing supportive care

## 2022-11-29 ENCOUNTER — OFFICE VISIT (OUTPATIENT)
Dept: INTERNAL MEDICINE | Facility: CLINIC | Age: 72
End: 2022-11-29

## 2022-11-29 VITALS
SYSTOLIC BLOOD PRESSURE: 130 MMHG | TEMPERATURE: 98.2 F | HEART RATE: 92 BPM | DIASTOLIC BLOOD PRESSURE: 60 MMHG | WEIGHT: 168.2 LBS | OXYGEN SATURATION: 98 % | HEIGHT: 63 IN | BODY MASS INDEX: 29.8 KG/M2

## 2022-11-29 DIAGNOSIS — R31.0 GROSS HEMATURIA: ICD-10-CM

## 2022-11-29 DIAGNOSIS — R73.01 IFG (IMPAIRED FASTING GLUCOSE): ICD-10-CM

## 2022-11-29 DIAGNOSIS — J20.9 ACUTE BRONCHITIS, UNSPECIFIED ORGANISM: ICD-10-CM

## 2022-11-29 DIAGNOSIS — E78.2 MIXED HYPERLIPIDEMIA: ICD-10-CM

## 2022-11-29 DIAGNOSIS — Z00.00 ENCOUNTER FOR ANNUAL WELLNESS VISIT (AWV) IN MEDICARE PATIENT: Primary | ICD-10-CM

## 2022-11-29 PROBLEM — R05.1 ACUTE COUGH: Status: ACTIVE | Noted: 2022-11-29

## 2022-11-29 LAB
BILIRUB BLD-MCNC: NEGATIVE MG/DL
CLARITY, POC: CLEAR
COLOR UR: YELLOW
EXPIRATION DATE: NORMAL
EXPIRATION DATE: NORMAL
FLUAV AG UPPER RESP QL IA.RAPID: NOT DETECTED
FLUBV AG UPPER RESP QL IA.RAPID: NOT DETECTED
GLUCOSE UR STRIP-MCNC: NEGATIVE MG/DL
INTERNAL CONTROL: NORMAL
KETONES UR QL: NEGATIVE
LEUKOCYTE EST, POC: NEGATIVE
Lab: NORMAL
Lab: NORMAL
NITRITE UR-MCNC: NEGATIVE MG/ML
PH UR: 7 [PH] (ref 5–8)
PROT UR STRIP-MCNC: NEGATIVE MG/DL
RBC # UR STRIP: NEGATIVE /UL
SARS-COV-2 AG UPPER RESP QL IA.RAPID: NOT DETECTED
SP GR UR: 1.01 (ref 1–1.03)
UROBILINOGEN UR QL: NORMAL

## 2022-11-29 PROCEDURE — G0439 PPPS, SUBSEQ VISIT: HCPCS | Performed by: NURSE PRACTITIONER

## 2022-11-29 PROCEDURE — 87428 SARSCOV & INF VIR A&B AG IA: CPT | Performed by: NURSE PRACTITIONER

## 2022-11-29 PROCEDURE — 81003 URINALYSIS AUTO W/O SCOPE: CPT | Performed by: NURSE PRACTITIONER

## 2022-11-29 PROCEDURE — 99213 OFFICE O/P EST LOW 20 MIN: CPT | Performed by: NURSE PRACTITIONER

## 2022-11-29 PROCEDURE — 1160F RVW MEDS BY RX/DR IN RCRD: CPT | Performed by: NURSE PRACTITIONER

## 2022-11-29 PROCEDURE — 1170F FXNL STATUS ASSESSED: CPT | Performed by: NURSE PRACTITIONER

## 2022-11-29 RX ORDER — CYCLOSPORINE 0.5 MG/ML
EMULSION OPHTHALMIC EVERY 12 HOURS
COMMUNITY
Start: 2022-10-17

## 2022-11-29 RX ORDER — BENZONATATE 100 MG/1
100 CAPSULE ORAL 3 TIMES DAILY PRN
Qty: 14 CAPSULE | Refills: 0 | Status: SHIPPED | OUTPATIENT
Start: 2022-11-29

## 2022-11-29 RX ORDER — AZITHROMYCIN 250 MG/1
TABLET, FILM COATED ORAL
Qty: 6 TABLET | Refills: 0 | Status: SHIPPED | OUTPATIENT
Start: 2022-11-29

## 2022-11-29 RX ORDER — CYCLOSPORINE 0.5 MG/ML
EMULSION OPHTHALMIC
COMMUNITY
Start: 2022-10-17 | End: 2022-11-29 | Stop reason: SDUPTHER

## 2022-11-29 NOTE — PROGRESS NOTES
"The ABCs of the Annual Wellness Visit  Subsequent Medicare Wellness Visit    Chief Complaint   Patient presents with   • Medicare Wellness-subsequent     Pt has had a cough for 5 or 6 weeks, congestion has worsening in the last couple weeks. Last Tuesday, she was coughing and got up to go to the bathroom and the toilet was filled with blood. Only happened one time. She also had a nose bleed the next day.       Subjective    History of Present Illness:  Freda Castañeda is a 72 y.o. female who presents for a Subsequent Medicare Wellness Visit.    She will need a follow up on hyperlipidemia and IFG. She is on Crestor 10mg daily. She has 2 new complaints as well.     She has a new complaint of cough for 5-6 weeks. She reports that it began as a \"hacky cough\", but has in increased and gotten worse. Cough is productive of yellow sputum. She is on Claritin, Flonase and benadryl. No known exposures. She denies fever ow wheezing.     She has a bright red blood urine in occur once on Tuesday after a coughing episode. This resolved, and she denies abd pain. She did have some pressure \"like menstrual cramps\" when she coughed.     The following portions of the patient's history were reviewed and   updated as appropriate: allergies, current medications, past family history, past medical history, past social history, past surgical history and problem list.    Compared to one year ago, the patient feels her physical   health is the same.    Compared to one year ago, the patient feels her mental   health is the same.    Recent Hospitalizations:  She was not admitted to the hospital during the last year.       Current Medical Providers:  Patient Care Team:  Berenice Morton APRN as PCP - General (Family Medicine)    Outpatient Medications Prior to Visit   Medication Sig Dispense Refill   • acetaminophen (TYLENOL) 650 MG 8 hr tablet Take 2 tablets by mouth.     • Cholecalciferol (VITAMIN D3) 2000 UNITS tablet Take 2,000 Units by mouth " daily.     • cycloSPORINE (RESTASIS) 0.05 % ophthalmic emulsion Apply  to eye(s) as directed by provider Every 12 (Twelve) Hours.     • loratadine (CLARITIN) 10 MG tablet Take  by mouth daily.     • mometasone (NASONEX) 50 MCG/ACT nasal spray into each nostril daily.     • Omega-3 Fatty Acids (FISH OIL) 1000 MG capsule capsule Take  by mouth daily.     • rosuvastatin (CRESTOR) 10 MG tablet Take 1 tablet by mouth Daily. 90 tablet 3   • Apple Keron Vn-Grn Tea-Bit Or-Cr (APPLE CIDER VINEGAR PLUS PO) Take  by mouth.     • Diclofenac Sodium (VOLTAREN) 1 % gel gel Apply 4 g topically to the appropriate area as directed 4 (Four) Times a Day As Needed (left knee pain). 350 g 0   • Restasis 0.05 % ophthalmic emulsion        No facility-administered medications prior to visit.       No opioid medication identified on active medication list. I have reviewed chart for other potential  high risk medication/s and harmful drug interactions in the elderly.          Aspirin is not on active medication list.  Aspirin use is not indicated based on review of current medical condition/s. Risk of harm outweighs potential benefits.  .    Patient Active Problem List   Diagnosis   • Hay fever   • Arthritis   • Hematochezia   • Gastroesophageal reflux disease   • Hyperlipidemia   • Polyarteritis nodosa (HCC)   • Arthritis of temporomandibular joint   • Menopause   • Encounter for screening mammogram for malignant neoplasm of breast   • Obesity (BMI 30-39.9)   • IFG (impaired fasting glucose)     Advance Care Planning  Advance Directive is not on file.  ACP discussion was held with the patient during this visit. Patient does not have an advance directive, information provided.    Review of Systems   Respiratory: Positive for cough. Negative for shortness of breath, wheezing and stridor.    Gastrointestinal: Negative.    Musculoskeletal: Negative.    Skin: Negative.    Hematological: Negative.    Psychiatric/Behavioral: Negative.        "  Objective    Vitals:    11/29/22 0754   Weight: 76.3 kg (168 lb 3.2 oz)   Height: 160 cm (62.99\")     Estimated body mass index is 29.8 kg/m² as calculated from the following:    Height as of this encounter: 160 cm (62.99\").    Weight as of this encounter: 76.3 kg (168 lb 3.2 oz).    BMI is >= 25 and <30. (Overweight) The following options were offered after discussion;: exercise counseling/recommendations      Does the patient have evidence of cognitive impairment? No    Physical Exam  Vitals reviewed.   Constitutional:       Appearance: Normal appearance. She is ill-appearing.   HENT:      Right Ear: Decreased hearing noted.      Left Ear: Decreased hearing noted.   Cardiovascular:      Rate and Rhythm: Normal rate and regular rhythm.      Pulses: Normal pulses.      Heart sounds: Normal heart sounds. No murmur heard.  Pulmonary:      Effort: Pulmonary effort is normal.   Neurological:      Mental Status: She is alert.       Lab Results   Component Value Date    CHLPL 148 11/22/2022    TRIG 154 (H) 11/22/2022    HDL 49 11/22/2022    LDL 73 11/22/2022    VLDL 26 11/22/2022            HEALTH RISK ASSESSMENT    Smoking Status:  Social History     Tobacco Use   Smoking Status Never   Smokeless Tobacco Never     Alcohol Consumption:  Social History     Substance and Sexual Activity   Alcohol Use No     Fall Risk Screen:    EUGENIA Fall Risk Assessment has not been completed.    Depression Screening:  PHQ-2/PHQ-9 Depression Screening 11/23/2021   Retired PHQ-9 Total Score 0   Retired Total Score 0       Health Habits and Functional and Cognitive Screening:  Functional & Cognitive Status 11/23/2021   Do you have difficulty preparing food and eating? No   Do you have difficulty bathing yourself, getting dressed or grooming yourself? No   Do you have difficulty using the toilet? No   Do you have difficulty moving around from place to place? No   Do you have trouble with steps or getting out of a bed or a chair? Yes "   Current Diet Well Balanced Diet   Dental Exam Not up to date   Eye Exam Up to date   Exercise (times per week) 3 times per week   Current Exercises Include Walking;Yard Work   Current Exercise Activities Include -   Do you need help using the phone?  No   Are you deaf or do you have serious difficulty hearing?  No   Do you need help with transportation? No   Do you need help shopping? No   Do you need help preparing meals?  No   Do you need help with housework?  No   Do you need help with laundry? No   Do you need help taking your medications? No   Do you need help managing money? No   Do you ever drive or ride in a car without wearing a seat belt? No   Have you felt unusual stress, anger or loneliness in the last month? No   Who do you live with? Spouse   If you need help, do you have trouble finding someone available to you? No   Have you been bothered in the last four weeks by sexual problems? No   Do you have difficulty concentrating, remembering or making decisions? No       Age-appropriate Screening Schedule:  Refer to the list below for future screening recommendations based on patient's age, sex and/or medical conditions. Orders for these recommended tests are listed in the plan section. The patient has been provided with a written plan.    Health Maintenance   Topic Date Due   • DXA SCAN  12/19/2021   • INFLUENZA VACCINE  08/01/2022   • ZOSTER VACCINE (2 of 2) 11/29/2022 (Originally 5/18/2017)   • PAP SMEAR  04/20/2025 (Originally 7/8/2018)   • LIPID PANEL  11/22/2023   • MAMMOGRAM  03/04/2024   • TDAP/TD VACCINES (2 - Td or Tdap) 03/23/2027              Assessment & Plan   CMS Preventative Services Quick Reference  Risk Factors Identified During Encounter  Hearing Problem  Immunizations Discussed/Encouraged (specific Immunizations; Influenza and COVID19  The above risks/problems have been discussed with the patient.  Follow up actions/plans if indicated are seen below in the Assessment/Plan  Section.  Pertinent information has been shared with the patient in the After Visit Summary.     Diagnosis Plan   1. Encounter for annual wellness visit (AWV) in Medicare patient        2. Mixed hyperlipidemia      LDL at goal on Crestor 10mg nightly      3. Acute bronchitis, unspecified organism  POCT SARS-CoV-2 Antigen MICHAEL + Flu    azithromycin (Zithromax) 250 MG tablet    benzonatate (Tessalon Perles) 100 MG capsule      4. Gross hematuria  POCT urinalysis dipstick, automated    Notify office if this recurs      5. IFG (impaired fasting glucose)      Hgb A1c 5.8%, continue diet.           Flu A and B and Covid testing POC are both negative    Follow Up:     1 year AWV and labs    An After Visit Summary and PPPS were made available to the patient.

## 2022-12-08 DIAGNOSIS — J20.9 ACUTE BRONCHITIS, UNSPECIFIED ORGANISM: ICD-10-CM

## 2022-12-08 RX ORDER — AZITHROMYCIN 250 MG/1
TABLET, FILM COATED ORAL
Qty: 6 TABLET | Refills: 0 | OUTPATIENT
Start: 2022-12-08

## 2022-12-08 NOTE — TELEPHONE ENCOUNTER
Caller: Freda Castañeda    Relationship: Self    Best call back number: [phone]    Requested Prescriptions:   Requested Prescriptions     Pending Prescriptions Disp Refills   • azithromycin (Zithromax) 250 MG tablet 6 tablet 0     Sig: Take 2 tablets the first day, then 1 tablet daily for 4 days.        Pharmacy where request should be sent: [facility] [id number] [address] [facility] [address] - [phone]  - [phone] FX     Additional details provided by patient: SINUS CONGESTION AND PRESSURE, GREEN MUCUS    Does the patient have less than a 3 day supply:  [] Yes  [] No    Would you like a call back once the refill request has been completed: [x] Yes [] No    If the office needs to give you a call back, can they leave a voicemail: [x] Yes [] No    Kurt Perdomo Rep   12/08/22 12:07 EST

## 2023-01-17 DIAGNOSIS — E78.2 MIXED HYPERLIPIDEMIA: ICD-10-CM

## 2023-01-18 RX ORDER — ROSUVASTATIN CALCIUM 10 MG/1
TABLET, COATED ORAL
Qty: 90 TABLET | Refills: 3 | Status: SHIPPED | OUTPATIENT
Start: 2023-01-18

## 2023-03-17 ENCOUNTER — TRANSCRIBE ORDERS (OUTPATIENT)
Dept: ADMINISTRATIVE | Facility: HOSPITAL | Age: 73
End: 2023-03-17
Payer: MEDICARE

## 2023-03-17 DIAGNOSIS — Z12.31 SCREENING MAMMOGRAM, ENCOUNTER FOR: Primary | ICD-10-CM

## 2023-03-30 ENCOUNTER — HOSPITAL ENCOUNTER (OUTPATIENT)
Dept: MAMMOGRAPHY | Facility: HOSPITAL | Age: 73
Discharge: HOME | End: 2023-03-30
Admitting: NURSE PRACTITIONER
Payer: MEDICARE

## 2023-03-30 DIAGNOSIS — Z12.31 SCREENING MAMMOGRAM, ENCOUNTER FOR: ICD-10-CM

## 2023-03-30 PROCEDURE — 77063 BREAST TOMOSYNTHESIS BI: CPT

## 2023-03-30 PROCEDURE — 77067 SCR MAMMO BI INCL CAD: CPT

## 2023-03-31 ENCOUNTER — TELEPHONE (OUTPATIENT)
Dept: INTERNAL MEDICINE | Facility: CLINIC | Age: 73
End: 2023-03-31
Payer: MEDICARE

## 2023-09-05 ENCOUNTER — TELEPHONE (OUTPATIENT)
Dept: GASTROENTEROLOGY | Facility: CLINIC | Age: 73
End: 2023-09-05
Payer: MEDICARE

## 2023-09-06 ENCOUNTER — OFFICE VISIT (OUTPATIENT)
Dept: INTERNAL MEDICINE | Facility: CLINIC | Age: 73
End: 2023-09-06
Payer: MEDICARE

## 2023-09-06 VITALS
SYSTOLIC BLOOD PRESSURE: 136 MMHG | HEIGHT: 63 IN | TEMPERATURE: 98.4 F | HEART RATE: 83 BPM | OXYGEN SATURATION: 97 % | BODY MASS INDEX: 29.06 KG/M2 | WEIGHT: 164 LBS | DIASTOLIC BLOOD PRESSURE: 78 MMHG

## 2023-09-06 DIAGNOSIS — R19.09 OTHER INTRA-ABDOMINAL AND PELVIC SWELLING, MASS AND LUMP: ICD-10-CM

## 2023-09-06 DIAGNOSIS — R10.13 EPIGASTRIC PAIN: Primary | ICD-10-CM

## 2023-09-06 DIAGNOSIS — R14.0 ABDOMINAL BLOATING: ICD-10-CM

## 2023-09-06 DIAGNOSIS — R10.13 EPIGASTRIC PAIN: ICD-10-CM

## 2023-09-06 DIAGNOSIS — K21.00 GASTROESOPHAGEAL REFLUX DISEASE WITH ESOPHAGITIS WITHOUT HEMORRHAGE: ICD-10-CM

## 2023-09-06 DIAGNOSIS — N30.00 ACUTE CYSTITIS WITHOUT HEMATURIA: ICD-10-CM

## 2023-09-06 DIAGNOSIS — Z12.73 SCREENING FOR OVARIAN CANCER: ICD-10-CM

## 2023-09-06 LAB
BILIRUB BLD-MCNC: NEGATIVE MG/DL
CLARITY, POC: CLEAR
COLOR UR: YELLOW
EXPIRATION DATE: ABNORMAL
GLUCOSE UR STRIP-MCNC: NEGATIVE MG/DL
KETONES UR QL: NEGATIVE
LEUKOCYTE EST, POC: ABNORMAL
Lab: ABNORMAL
NITRITE UR-MCNC: NEGATIVE MG/ML
PH UR: 5 [PH] (ref 5–8)
PROT UR STRIP-MCNC: NEGATIVE MG/DL
RBC # UR STRIP: NEGATIVE /UL
SP GR UR: 1.01 (ref 1–1.03)
UROBILINOGEN UR QL: ABNORMAL

## 2023-09-06 PROCEDURE — 99214 OFFICE O/P EST MOD 30 MIN: CPT | Performed by: NURSE PRACTITIONER

## 2023-09-06 PROCEDURE — 81003 URINALYSIS AUTO W/O SCOPE: CPT | Performed by: NURSE PRACTITIONER

## 2023-09-06 PROCEDURE — 36415 COLL VENOUS BLD VENIPUNCTURE: CPT | Performed by: NURSE PRACTITIONER

## 2023-09-06 RX ORDER — CIPROFLOXACIN 250 MG/1
250 TABLET, FILM COATED ORAL 2 TIMES DAILY
Qty: 6 TABLET | Refills: 0 | Status: SHIPPED | OUTPATIENT
Start: 2023-09-06

## 2023-09-06 RX ORDER — PANTOPRAZOLE SODIUM 40 MG/1
40 TABLET, DELAYED RELEASE ORAL DAILY
Qty: 90 TABLET | Refills: 1 | Status: SHIPPED | OUTPATIENT
Start: 2023-09-06

## 2023-09-06 RX ORDER — FLUTICASONE PROPIONATE 50 MCG
1 SPRAY, SUSPENSION (ML) NASAL DAILY
COMMUNITY

## 2023-09-06 RX ORDER — OMEPRAZOLE 20 MG/1
20 CAPSULE, DELAYED RELEASE ORAL DAILY
COMMUNITY
End: 2023-09-06

## 2023-09-06 RX ORDER — TIMOLOL 5 MG/ML
1 SOLUTION/ DROPS OPHTHALMIC EVERY 24 HOURS
COMMUNITY
Start: 2023-08-14

## 2023-09-06 NOTE — PROGRESS NOTES
Venipuncture Blood Specimen Collection  Venipuncture performed in left arm by Ivonne Wiseman MA with good hemostasis. Patient tolerated the procedure well without complications.   09/06/23   Ivonne Wiseman MA

## 2023-09-06 NOTE — PROGRESS NOTES
"Chief Complaint   Patient presents with    Abdominal Pain     C/O both abdominal and back pain, x 1 month, was asking about a  test       Subjective     Freda Castañeda is a 72 y.o. female being seen for an acute issue for abd bloating and pain for 4 weeks. She reports that she is having lower abd pain and pressure for 4 weeks. Associated dysuria. No blood in urine.    Second complaint of increased GERD for 4 weeks. Daily indigestion, despite taking the omeprazole 20mg daily. Reflux described as \"chest on fire.\"  Worse in the morning. She has an alternating bowel pattern. She has history of tubal ligation and lap mindy.    Answers submitted by the patient for this visit:  Primary Reason for Visit (Submitted on 9/5/2023)  What is the primary reason for your visit?: Abdominal Pain        Abdominal Pain  This is a new problem. The problem has been waxing and waning. The pain is located in the generalized abdominal region. The pain is at a severity of 5/10. The quality of the pain is a sensation of fullness. The abdominal pain radiates to the back. Associated symptoms include arthralgias, belching, constipation, diarrhea, dysuria, melena, myalgias and nausea. Pertinent negatives include no anorexia, fever, flatus, headaches, hematochezia, hematuria, vomiting or weight loss. The pain is aggravated by nothing and movement. Nothing and movement aggravates the pain.      No Known Allergies      Current Outpatient Medications:     acetaminophen (TYLENOL) 650 MG 8 hr tablet, Take 2 tablets by mouth., Disp: , Rfl:     Cholecalciferol (VITAMIN D3) 2000 UNITS tablet, Take 1 tablet by mouth Daily., Disp: , Rfl:     cycloSPORINE (RESTASIS) 0.05 % ophthalmic emulsion, Apply  to eye(s) as directed by provider Every 12 (Twelve) Hours., Disp: , Rfl:     loratadine (CLARITIN) 10 MG tablet, Take  by mouth daily., Disp: , Rfl:     Omega-3 Fatty Acids (FISH OIL) 1000 MG capsule capsule, Take  by mouth daily., Disp: , Rfl:     " rosuvastatin (CRESTOR) 10 MG tablet, TAKE 1 TABLET DAILY, Disp: 90 tablet, Rfl: 3    Timolol Maleate PF 0.5 % solution, Apply 1 drop to eye(s) as directed by provider Daily., Disp: , Rfl:     Apple Keron Vn-Grn Tea-Bit Or-Cr (APPLE CIDER VINEGAR PLUS PO), Take  by mouth. (Patient not taking: Reported on 9/6/2023), Disp: , Rfl:     azithromycin (Zithromax) 250 MG tablet, Take 2 tablets the first day, then 1 tablet daily for 4 days., Disp: 6 tablet, Rfl: 0    benzonatate (Tessalon Perles) 100 MG capsule, Take 1 capsule by mouth 3 (Three) Times a Day As Needed for Cough. (Patient not taking: Reported on 9/6/2023), Disp: 14 capsule, Rfl: 0    fluticasone (FLONASE) 50 MCG/ACT nasal spray, 1 spray into the nostril(s) as directed by provider Daily., Disp: , Rfl:     mometasone (NASONEX) 50 MCG/ACT nasal spray, into each nostril daily. (Patient not taking: Reported on 9/6/2023), Disp: , Rfl:     The following portions of the patient's history were reviewed and updated as appropriate: allergies, current medications, past family history, past medical history, past social history, past surgical history, and problem list.    Review of Systems   Constitutional: Negative.  Negative for fever and weight loss.   Gastrointestinal:  Positive for abdominal pain, constipation, diarrhea, melena and nausea. Negative for anal bleeding, anorexia, blood in stool, flatus, hematochezia, rectal pain and vomiting.   Genitourinary:  Positive for dysuria, flank pain and pelvic pain. Negative for hematuria, menstrual problem and urgency.   Musculoskeletal:  Positive for arthralgias and myalgias.   Neurological:  Negative for dizziness, seizures and headaches.   Hematological: Negative.    All other systems reviewed and are negative.    Assessment     Physical Exam  Vitals reviewed.   Constitutional:       Appearance: Normal appearance.   Cardiovascular:      Rate and Rhythm: Normal rate and regular rhythm.      Pulses: Normal pulses.      Heart  sounds: Normal heart sounds. No murmur heard.  Pulmonary:      Effort: Pulmonary effort is normal. No respiratory distress.      Breath sounds: Normal breath sounds. No stridor.   Abdominal:      General: Bowel sounds are normal.      Palpations: Abdomen is soft.      Tenderness: There is abdominal tenderness in the right lower quadrant, epigastric area, suprapubic area and left lower quadrant. There is no right CVA tenderness, left CVA tenderness or guarding. Negative signs include Ewing's sign.      Hernia: No hernia is present.   Musculoskeletal:      Cervical back: Neck supple.   Neurological:      Mental Status: She is alert.       Plan         Diagnoses and all orders for this visit:    1. Epigastric pain (Primary)  -     CBC w AUTO Differential  -     Comprehensive metabolic panel  -     H. Pylori IgM, Blood  -     Amylase  -     Lipase    2. Abdominal bloating  -     POCT urinalysis dipstick, automated  -       -     Amylase  -     Lipase    3. Acute cystitis without hematuria  -     ciprofloxacin (Cipro) 250 MG tablet; Take 1 tablet by mouth 2 (Two) Times a Day.  Dispense: 6 tablet; Refill: 0    4. Gastroesophageal reflux disease with esophagitis without hemorrhage  -     CBC w AUTO Differential  -     Comprehensive metabolic panel  -     Ambulatory Referral to Gastroenterology    5. Screening for ovarian cancer  -         6. Other intra-abdominal and pelvic swelling, mass and lump  -         Other orders  -     pantoprazole (Protonix) 40 MG EC tablet; Take 1 tablet by mouth Daily.  Dispense: 90 tablet; Refill: 1      UTI, will treat with Cipro. Urine culture sent today    Will need EGD and colonoscopy, referral placed.     GERD poorly controlled. Stop omeprazole, switch to protonix 40mg daily    Set up Pelvic 11- to check for pelvic prolapse.

## 2023-09-07 LAB
ALBUMIN SERPL-MCNC: 5 G/DL (ref 3.5–5.2)
ALBUMIN/GLOB SERPL: 2.6 G/DL
ALP SERPL-CCNC: 87 U/L (ref 39–117)
ALT SERPL-CCNC: 14 U/L (ref 1–33)
AMYLASE SERPL-CCNC: 32 U/L (ref 28–100)
AST SERPL-CCNC: 16 U/L (ref 1–32)
BASOPHILS # BLD AUTO: 0.03 10*3/MM3 (ref 0–0.2)
BASOPHILS NFR BLD AUTO: 0.6 % (ref 0–1.5)
BILIRUB SERPL-MCNC: 1.2 MG/DL (ref 0–1.2)
BUN SERPL-MCNC: 19 MG/DL (ref 8–23)
BUN/CREAT SERPL: 34.5 (ref 7–25)
CALCIUM SERPL-MCNC: 10.2 MG/DL (ref 8.6–10.5)
CANCER AG125 SERPL-ACNC: 10.7 U/ML (ref 0–38.1)
CHLORIDE SERPL-SCNC: 104 MMOL/L (ref 98–107)
CO2 SERPL-SCNC: 22.8 MMOL/L (ref 22–29)
CREAT SERPL-MCNC: 0.55 MG/DL (ref 0.57–1)
EGFRCR SERPLBLD CKD-EPI 2021: 98.1 ML/MIN/1.73
EOSINOPHIL # BLD AUTO: 0.06 10*3/MM3 (ref 0–0.4)
EOSINOPHIL NFR BLD AUTO: 1.2 % (ref 0.3–6.2)
ERYTHROCYTE [DISTWIDTH] IN BLOOD BY AUTOMATED COUNT: 13.5 % (ref 12.3–15.4)
GLOBULIN SER CALC-MCNC: 1.9 GM/DL
GLUCOSE SERPL-MCNC: 108 MG/DL (ref 65–99)
H PYLORI IGM SER-ACNC: <9 UNITS (ref 0–8.9)
HCT VFR BLD AUTO: 43 % (ref 34–46.6)
HGB BLD-MCNC: 14.2 G/DL (ref 12–15.9)
IMM GRANULOCYTES # BLD AUTO: 0.02 10*3/MM3 (ref 0–0.05)
IMM GRANULOCYTES NFR BLD AUTO: 0.4 % (ref 0–0.5)
LIPASE SERPL-CCNC: 25 U/L (ref 13–60)
LYMPHOCYTES # BLD AUTO: 1.78 10*3/MM3 (ref 0.7–3.1)
LYMPHOCYTES NFR BLD AUTO: 35.2 % (ref 19.6–45.3)
MCH RBC QN AUTO: 30.7 PG (ref 26.6–33)
MCHC RBC AUTO-ENTMCNC: 33 G/DL (ref 31.5–35.7)
MCV RBC AUTO: 92.9 FL (ref 79–97)
MONOCYTES # BLD AUTO: 0.46 10*3/MM3 (ref 0.1–0.9)
MONOCYTES NFR BLD AUTO: 9.1 % (ref 5–12)
NEUTROPHILS # BLD AUTO: 2.7 10*3/MM3 (ref 1.7–7)
NEUTROPHILS NFR BLD AUTO: 53.5 % (ref 42.7–76)
NRBC BLD AUTO-RTO: 0 /100 WBC (ref 0–0.2)
PLATELET # BLD AUTO: 322 10*3/MM3 (ref 140–450)
POTASSIUM SERPL-SCNC: 4.7 MMOL/L (ref 3.5–5.2)
PROT SERPL-MCNC: 6.9 G/DL (ref 6–8.5)
RBC # BLD AUTO: 4.63 10*6/MM3 (ref 3.77–5.28)
SODIUM SERPL-SCNC: 139 MMOL/L (ref 136–145)
WBC # BLD AUTO: 5.05 10*3/MM3 (ref 3.4–10.8)

## 2023-09-08 LAB
BACTERIA UR CULT: NORMAL
BACTERIA UR CULT: NORMAL

## 2023-09-12 ENCOUNTER — OFFICE VISIT (OUTPATIENT)
Dept: GASTROENTEROLOGY | Facility: CLINIC | Age: 73
End: 2023-09-12
Payer: MEDICARE

## 2023-09-12 VITALS
SYSTOLIC BLOOD PRESSURE: 128 MMHG | WEIGHT: 165.8 LBS | BODY MASS INDEX: 29.38 KG/M2 | DIASTOLIC BLOOD PRESSURE: 78 MMHG | HEIGHT: 63 IN

## 2023-09-12 DIAGNOSIS — K21.9 GASTROESOPHAGEAL REFLUX DISEASE, UNSPECIFIED WHETHER ESOPHAGITIS PRESENT: ICD-10-CM

## 2023-09-12 DIAGNOSIS — K58.1 IRRITABLE BOWEL SYNDROME WITH CONSTIPATION: Primary | ICD-10-CM

## 2023-09-12 DIAGNOSIS — Z86.010 PERSONAL HISTORY OF COLONIC POLYPS: ICD-10-CM

## 2023-09-12 PROBLEM — Z86.0100 PERSONAL HISTORY OF COLONIC POLYPS: Status: ACTIVE | Noted: 2023-09-12

## 2023-09-12 RX ORDER — ALUMINUM HYDROXIDE AND MAGNESIUM CARBONATE 254; 237.5 MG/5ML; MG/5ML
20 LIQUID ORAL 4 TIMES DAILY PRN
Qty: 355 ML | Refills: 11 | Status: SHIPPED | OUTPATIENT
Start: 2023-09-12

## 2023-09-12 RX ORDER — PLECANATIDE 3 MG/1
3 TABLET ORAL DAILY
Qty: 30 TABLET | Refills: 11 | Status: SHIPPED | OUTPATIENT
Start: 2023-09-12

## 2023-09-12 RX ORDER — OMEPRAZOLE 40 MG/1
40 CAPSULE, DELAYED RELEASE ORAL DAILY
Qty: 90 CAPSULE | Refills: 3 | Status: SHIPPED | OUTPATIENT
Start: 2023-09-12

## 2023-09-12 NOTE — ASSESSMENT & PLAN NOTE
- EGD to further evaluate   - Start Gaviscon PRN (patient is interested in having a medication PRN primarily)  - Stop Pantoprazole given side effects and start Omeprazole 40 mg QD (if uncontrolled with PRN Gaviscon alone)

## 2023-09-12 NOTE — PROGRESS NOTES
Freda Castañeda is a 72 y.o. female with a past medical history of GERD, IBS-C, HLD who presents for evaluation of   Chief Complaint   Patient presents with    Heartburn       Subjective     # IBS-C  - Endorses constipation ongoing for over a year further described as straining with each bowel movement. Requires a laxative and stool softener to have more than one bowel movement per week.   - Also endorses abdominal bloating relieved intermittently following a bowel movement. Additionally endorses fecal incontinence that occurs intermittently when walking for exercise.   - Reports Linzess caused diarrhea in the past.     # GERD   - Reports worsening heartburn ongoing for over 4 weeks.   - Adherent to Protonix 40 mg QD but reports it has worsened her symptoms in regards to increased nausea. She does not wish to continue to take it.   - Additionally endorses mild nausea but is not interested in starting an anti-emetic.   - Denies dysphagia or overt bleeding.   - Last EGD in 11/2018 showed Grade A Esophagitis, gastritis (biopsied & negative for H Pylori), duodenitis.     # Personal history of colon polyps   - Last colonoscopy in 11/2018 had a 7 mm TA removed from the cecum. Was instructed to repeat colonoscopy in 11/2023 for surveillance.             Past Medical History:   Diagnosis Date    GERD (gastroesophageal reflux disease)     Hyperlipidemia     Menopause 11/14/2017    Need for diphtheria-tetanus-pertussis (Tdap) vaccine 3/23/2017    Pap smear for cervical cancer screening 11/14/2017    Seasonal allergies          Current Outpatient Medications:     acetaminophen (TYLENOL) 650 MG 8 hr tablet, Take 2 tablets by mouth., Disp: , Rfl:     Cholecalciferol (VITAMIN D3) 2000 UNITS tablet, Take 1 tablet by mouth Daily., Disp: , Rfl:     cycloSPORINE (RESTASIS) 0.05 % ophthalmic emulsion, Apply  to eye(s) as directed by provider Every 12 (Twelve) Hours., Disp: , Rfl:     fluticasone (FLONASE) 50 MCG/ACT nasal spray, 1  spray into the nostril(s) as directed by provider Daily., Disp: , Rfl:     loratadine (CLARITIN) 10 MG tablet, Take  by mouth daily., Disp: , Rfl:     Omega-3 Fatty Acids (FISH OIL) 1000 MG capsule capsule, Take  by mouth daily., Disp: , Rfl:     rosuvastatin (CRESTOR) 10 MG tablet, TAKE 1 TABLET DAILY, Disp: 90 tablet, Rfl: 3    Timolol Maleate PF 0.5 % solution, Apply 1 drop to eye(s) as directed by provider Daily., Disp: , Rfl:     Alum Hydroxide-Mag Carbonate (Gaviscon Extra Strength) 508-475 MG/10ML suspension, Take 20 mL by mouth 4 (Four) Times a Day As Needed (heartburn)., Disp: 355 mL, Rfl: 11    omeprazole (priLOSEC) 40 MG capsule, Take 1 capsule by mouth Daily., Disp: 90 capsule, Rfl: 3    Plecanatide (Trulance) 3 MG tablet, Take 1 tablet by mouth Daily., Disp: 30 tablet, Rfl: 11    No Known Allergies    Social History     Socioeconomic History    Marital status:    Tobacco Use    Smoking status: Never    Smokeless tobacco: Never   Substance and Sexual Activity    Alcohol use: No    Drug use: No    Sexual activity: Not Currently     Partners: Male       Family History   Problem Relation Age of Onset    Breast cancer Sister     Breast cancer Other     Stroke Brother 72        PFO    Stroke Sister     Colon polyps Neg Hx     Crohn's disease Neg Hx        Objective     Vitals:    09/12/23 1549   BP: 128/78         09/12/23  1549   Weight: 75.2 kg (165 lb 12.8 oz)     Body mass index is 29.37 kg/m².    Physical Exam  Constitutional:       Appearance: Normal appearance.   HENT:      Head: Normocephalic and atraumatic.   Eyes:      Extraocular Movements: Extraocular movements intact.      Pupils: Pupils are equal, round, and reactive to light.   Cardiovascular:      Rate and Rhythm: Normal rate and regular rhythm.      Heart sounds: Normal heart sounds.   Pulmonary:      Effort: Pulmonary effort is normal.      Breath sounds: Normal breath sounds.   Abdominal:      General: Abdomen is flat. Bowel sounds  are normal. There is no distension.      Palpations: Abdomen is soft.      Tenderness: There is abdominal tenderness (mild LLQ).   Neurological:      Mental Status: She is alert.   Psychiatric:         Mood and Affect: Mood normal.         Behavior: Behavior normal.       WBC   Date Value Ref Range Status   09/06/2023 5.05 3.40 - 10.80 10*3/mm3 Final     RBC   Date Value Ref Range Status   09/06/2023 4.63 3.77 - 5.28 10*6/mm3 Final     Hemoglobin   Date Value Ref Range Status   09/06/2023 14.2 12.0 - 15.9 g/dL Final   04/05/2016 14.3 12.0 - 16.0 g/dL Final     Hematocrit   Date Value Ref Range Status   09/06/2023 43.0 34.0 - 46.6 % Final   04/05/2016 42.1 37.0 - 47.0 % Final     MCV   Date Value Ref Range Status   09/06/2023 92.9 79.0 - 97.0 fL Final   04/05/2016 92.1 81.0 - 99.0 fL Final     MCH   Date Value Ref Range Status   09/06/2023 30.7 26.6 - 33.0 pg Final   04/05/2016 31.3 (H) 27.0 - 31.0 pg Final     MCHC   Date Value Ref Range Status   09/06/2023 33.0 31.5 - 35.7 g/dL Final   04/05/2016 34.0 31.0 - 37.0 g/dL Final     RDW   Date Value Ref Range Status   09/06/2023 13.5 12.3 - 15.4 % Final   04/05/2016 13.2 11.5 - 14.5 % Final     RDW-SD   Date Value Ref Range Status   04/05/2016 45.0 37.0 - 54.0 fl Final     MPV   Date Value Ref Range Status   04/05/2016 8.8 7.4 - 10.4 fL Final     Platelets   Date Value Ref Range Status   09/06/2023 322 140 - 450 10*3/mm3 Final   04/05/2016 281 140 - 500 10*3/mm3 Final     Neutrophil Rel %   Date Value Ref Range Status   09/06/2023 53.5 42.7 - 76.0 % Final     Neutrophil %   Date Value Ref Range Status   04/05/2016 64.2 45.0 - 70.0 % Final     Lymphocyte Rel %   Date Value Ref Range Status   09/06/2023 35.2 19.6 - 45.3 % Final     Lymphocyte %   Date Value Ref Range Status   04/05/2016 25.5 20.0 - 45.0 % Final     Monocyte Rel %   Date Value Ref Range Status   09/06/2023 9.1 5.0 - 12.0 % Final     Monocyte %   Date Value Ref Range Status   04/05/2016 8.8 (H) 3.0 - 8.0 %  Final     Eosinophil Rel %   Date Value Ref Range Status   09/06/2023 1.2 0.3 - 6.2 % Final     Eosinophil %   Date Value Ref Range Status   04/05/2016 0.8 0.0 - 4.0 % Final     Basophil Rel %   Date Value Ref Range Status   09/06/2023 0.6 0.0 - 1.5 % Final     Basophil %   Date Value Ref Range Status   04/05/2016 0.4 0.0 - 2.0 % Final     Immature Grans %   Date Value Ref Range Status   04/05/2016 0.3 0.0 - 0.5 % Final     Neutrophils Absolute   Date Value Ref Range Status   09/06/2023 2.70 1.70 - 7.00 10*3/mm3 Final     Neutrophils, Absolute   Date Value Ref Range Status   04/05/2016 4.68 1.50 - 8.30 10*3/mm3 Final     Lymphocytes Absolute   Date Value Ref Range Status   09/06/2023 1.78 0.70 - 3.10 10*3/mm3 Final     Lymphocytes, Absolute   Date Value Ref Range Status   04/05/2016 1.86 0.60 - 4.80 10*3/mm3 Final     Monocytes Absolute   Date Value Ref Range Status   09/06/2023 0.46 0.10 - 0.90 10*3/mm3 Final     Monocytes, Absolute   Date Value Ref Range Status   04/05/2016 0.64 0.00 - 1.00 10*3/mm3 Final     Eosinophils Absolute   Date Value Ref Range Status   09/06/2023 0.06 0.00 - 0.40 10*3/mm3 Final     Eosinophils, Absolute   Date Value Ref Range Status   04/05/2016 0.06 (L) 0.10 - 0.30 10*3/mm3 Final     Basophils Absolute   Date Value Ref Range Status   09/06/2023 0.03 0.00 - 0.20 10*3/mm3 Final     Basophils, Absolute   Date Value Ref Range Status   04/05/2016 0.03 0.00 - 0.20 10*3/mm3 Final     Immature Grans, Absolute   Date Value Ref Range Status   04/05/2016 0.02 0.00 - 0.03 10*3/mm3 Final     nRBC   Date Value Ref Range Status   09/06/2023 0.0 0.0 - 0.2 /100 WBC Final   04/05/2016 0.0 0.0 - 0.0 /100 WBC Final       Glucose   Date Value Ref Range Status   09/06/2023 108 (H) 65 - 99 mg/dL Final   04/05/2016 100 (H) 65 - 99 mg/dL Final     Sodium   Date Value Ref Range Status   09/06/2023 139 136 - 145 mmol/L Final   04/05/2016 139 136 - 145 mmol/L Final     Potassium   Date Value Ref Range Status    09/06/2023 4.7 3.5 - 5.2 mmol/L Final   04/05/2016 4.6 3.5 - 5.2 mmol/L Final     CO2   Date Value Ref Range Status   04/05/2016 25.0 22.0 - 29.0 mmol/L Final     Total CO2   Date Value Ref Range Status   09/06/2023 22.8 22.0 - 29.0 mmol/L Final     Chloride   Date Value Ref Range Status   09/06/2023 104 98 - 107 mmol/L Final   04/05/2016 102 98 - 107 mmol/L Final     Anion Gap   Date Value Ref Range Status   04/05/2016 12.0 mmol/L Final     Creatinine   Date Value Ref Range Status   09/06/2023 0.55 (L) 0.57 - 1.00 mg/dL Final   04/05/2016 0.58 0.57 - 1.00 mg/dL Final     BUN   Date Value Ref Range Status   09/06/2023 19 8 - 23 mg/dL Final   04/05/2016 8 8 - 23 mg/dL Final     BUN/Creatinine Ratio   Date Value Ref Range Status   09/06/2023 34.5 (H) 7.0 - 25.0 Final   04/05/2016 13.8 7.0 - 25.0 Final     Calcium   Date Value Ref Range Status   09/06/2023 10.2 8.6 - 10.5 mg/dL Final   04/05/2016 9.3 8.8 - 10.5 mg/dL Final     eGFR Non  Am   Date Value Ref Range Status   11/16/2021 93 >59 mL/min/1.73 Final     eGFR Non  Amer   Date Value Ref Range Status   04/05/2016 105 >60 mL/min/1.73 Final     Alkaline Phosphatase   Date Value Ref Range Status   09/06/2023 87 39 - 117 U/L Final   04/05/2016 69 40 - 129 U/L Final     Total Protein   Date Value Ref Range Status   04/05/2016 6.8 6.0 - 8.5 g/dL Final     ALT (SGPT)   Date Value Ref Range Status   09/06/2023 14 1 - 33 U/L Final   04/05/2016 17 5 - 33 U/L Final     AST (SGOT)   Date Value Ref Range Status   09/06/2023 16 1 - 32 U/L Final   04/05/2016 18 5 - 32 U/L Final     Total Bilirubin   Date Value Ref Range Status   09/06/2023 1.2 0.0 - 1.2 mg/dL Final   04/05/2016 1.0 0.2 - 1.2 mg/dL Final     Albumin   Date Value Ref Range Status   09/06/2023 5.0 3.5 - 5.2 g/dL Final   04/05/2016 4.20 3.50 - 5.20 g/dL Final     Globulin   Date Value Ref Range Status   04/05/2016 2.6 gm/dL Final     A/G Ratio   Date Value Ref Range Status   09/06/2023 2.6 g/dL Final          Imaging Results (Last 7 Days)       ** No results found for the last 168 hours. **                   Assessment & Plan     Diagnoses and all orders for this visit:    1. Irritable bowel syndrome with constipation (Primary)  Assessment & Plan:  - Start Trulance   - Noted to previously have diarrhea with Linzess     Orders:  -     Plecanatide (Trulance) 3 MG tablet; Take 1 tablet by mouth Daily.  Dispense: 30 tablet; Refill: 11    2. Gastroesophageal reflux disease, unspecified whether esophagitis present  Assessment & Plan:  - EGD to further evaluate   - Start Gaviscon PRN (patient is interested in having a medication PRN primarily)  - Stop Pantoprazole given side effects and start Omeprazole 40 mg QD (if uncontrolled with PRN Gaviscon alone)    Orders:  -     Case Request; Standing  -     Case Request  -     Alum Hydroxide-Mag Carbonate (Gaviscon Extra Strength) 508-475 MG/10ML suspension; Take 20 mL by mouth 4 (Four) Times a Day As Needed (heartburn).  Dispense: 355 mL; Refill: 11  -     omeprazole (priLOSEC) 40 MG capsule; Take 1 capsule by mouth Daily.  Dispense: 90 capsule; Refill: 3    3. Personal history of colonic polyps  Assessment & Plan:  - Due for surveillance colonoscopy in 11/2023, will schedule     Orders:  -     Case Request; Standing  -     Case Request    Other orders  -     Obtain Informed Consent; Standing  -     Follow Anesthesia Guidelines / Protocol; Future        I have discussed the above plan with the patient.  They verbalize understanding and are in agreement with the plan.  They have been advised to contact the office for any questions, concerns, or changes related to their health.

## 2023-09-14 ENCOUNTER — PATIENT ROUNDING (BHMG ONLY) (OUTPATIENT)
Dept: GASTROENTEROLOGY | Facility: CLINIC | Age: 73
End: 2023-09-14
Payer: MEDICARE

## 2023-09-22 NOTE — PROGRESS NOTES
[facility]  [address]  [place] [address] [phone].    Before we get started may I verify your date of birth? 1950    I am calling to officially welcome you to our practice and ask about your recent visit. Is this a good time to talk?     Tell me about your visit with us. What things went well?  TC       We're always looking for ways to make our patients' experiences even better. Do you have recommendations on ways we may improve?      Overall were you satisfied with your first visit to our practice?      I appreciate you taking the time to speak with me today. Is there anything else I can do for you?       Thank you, and have a great day.

## 2023-11-02 ENCOUNTER — ANESTHESIA EVENT (OUTPATIENT)
Dept: PERIOP | Facility: HOSPITAL | Age: 73
End: 2023-11-02
Payer: MEDICARE

## 2023-11-03 ENCOUNTER — HOSPITAL ENCOUNTER (OUTPATIENT)
Facility: HOSPITAL | Age: 73
Setting detail: HOSPITAL OUTPATIENT SURGERY
Discharge: HOME | End: 2023-11-03
Attending: STUDENT IN AN ORGANIZED HEALTH CARE EDUCATION/TRAINING PROGRAM | Admitting: STUDENT IN AN ORGANIZED HEALTH CARE EDUCATION/TRAINING PROGRAM
Payer: MEDICARE

## 2023-11-03 ENCOUNTER — ANESTHESIA (OUTPATIENT)
Dept: PERIOP | Facility: HOSPITAL | Age: 73
End: 2023-11-03
Payer: MEDICARE

## 2023-11-03 VITALS
BODY MASS INDEX: 27.55 KG/M2 | RESPIRATION RATE: 12 BRPM | HEIGHT: 64 IN | OXYGEN SATURATION: 95 % | DIASTOLIC BLOOD PRESSURE: 86 MMHG | SYSTOLIC BLOOD PRESSURE: 156 MMHG | WEIGHT: 161.4 LBS | TEMPERATURE: 97.7 F | HEART RATE: 74 BPM

## 2023-11-03 DIAGNOSIS — K21.9 GASTROESOPHAGEAL REFLUX DISEASE, UNSPECIFIED WHETHER ESOPHAGITIS PRESENT: ICD-10-CM

## 2023-11-03 DIAGNOSIS — Z86.010 PERSONAL HISTORY OF COLONIC POLYPS: ICD-10-CM

## 2023-11-03 PROCEDURE — 25010000002 PROPOFOL 200 MG/20ML EMULSION: Performed by: NURSE ANESTHETIST, CERTIFIED REGISTERED

## 2023-11-03 PROCEDURE — 45385 COLONOSCOPY W/LESION REMOVAL: CPT | Performed by: STUDENT IN AN ORGANIZED HEALTH CARE EDUCATION/TRAINING PROGRAM

## 2023-11-03 PROCEDURE — 25810000003 LACTATED RINGERS PER 1000 ML: Performed by: NURSE ANESTHETIST, CERTIFIED REGISTERED

## 2023-11-03 PROCEDURE — 43251 EGD REMOVE LESION SNARE: CPT | Performed by: STUDENT IN AN ORGANIZED HEALTH CARE EDUCATION/TRAINING PROGRAM

## 2023-11-03 PROCEDURE — 43239 EGD BIOPSY SINGLE/MULTIPLE: CPT | Performed by: STUDENT IN AN ORGANIZED HEALTH CARE EDUCATION/TRAINING PROGRAM

## 2023-11-03 PROCEDURE — 88305 TISSUE EXAM BY PATHOLOGIST: CPT | Performed by: STUDENT IN AN ORGANIZED HEALTH CARE EDUCATION/TRAINING PROGRAM

## 2023-11-03 RX ORDER — SODIUM CHLORIDE 0.9 % (FLUSH) 0.9 %
10 SYRINGE (ML) INJECTION AS NEEDED
Status: DISCONTINUED | OUTPATIENT
Start: 2023-11-03 | End: 2023-11-03 | Stop reason: HOSPADM

## 2023-11-03 RX ORDER — SODIUM CHLORIDE, SODIUM LACTATE, POTASSIUM CHLORIDE, CALCIUM CHLORIDE 600; 310; 30; 20 MG/100ML; MG/100ML; MG/100ML; MG/100ML
9 INJECTION, SOLUTION INTRAVENOUS CONTINUOUS PRN
Status: DISCONTINUED | OUTPATIENT
Start: 2023-11-03 | End: 2023-11-03 | Stop reason: HOSPADM

## 2023-11-03 RX ORDER — MAGNESIUM HYDROXIDE 1200 MG/15ML
LIQUID ORAL AS NEEDED
Status: DISCONTINUED | OUTPATIENT
Start: 2023-11-03 | End: 2023-11-03 | Stop reason: HOSPADM

## 2023-11-03 RX ORDER — SODIUM CHLORIDE 9 MG/ML
40 INJECTION, SOLUTION INTRAVENOUS AS NEEDED
Status: DISCONTINUED | OUTPATIENT
Start: 2023-11-03 | End: 2023-11-03 | Stop reason: HOSPADM

## 2023-11-03 RX ORDER — LIDOCAINE HYDROCHLORIDE 20 MG/ML
INJECTION, SOLUTION INFILTRATION; PERINEURAL AS NEEDED
Status: DISCONTINUED | OUTPATIENT
Start: 2023-11-03 | End: 2023-11-03 | Stop reason: SURG

## 2023-11-03 RX ORDER — SODIUM CHLORIDE 0.9 % (FLUSH) 0.9 %
10 SYRINGE (ML) INJECTION EVERY 12 HOURS SCHEDULED
Status: DISCONTINUED | OUTPATIENT
Start: 2023-11-03 | End: 2023-11-03 | Stop reason: HOSPADM

## 2023-11-03 RX ORDER — ONDANSETRON 2 MG/ML
4 INJECTION INTRAMUSCULAR; INTRAVENOUS ONCE AS NEEDED
Status: DISCONTINUED | OUTPATIENT
Start: 2023-11-03 | End: 2023-11-03 | Stop reason: HOSPADM

## 2023-11-03 RX ORDER — PROPOFOL 10 MG/ML
INJECTION, EMULSION INTRAVENOUS AS NEEDED
Status: DISCONTINUED | OUTPATIENT
Start: 2023-11-03 | End: 2023-11-03 | Stop reason: SURG

## 2023-11-03 RX ADMIN — PROPOFOL 50 MG: 10 INJECTION, EMULSION INTRAVENOUS at 12:10

## 2023-11-03 RX ADMIN — PROPOFOL 30 MG: 10 INJECTION, EMULSION INTRAVENOUS at 12:35

## 2023-11-03 RX ADMIN — PROPOFOL 100 MG: 10 INJECTION, EMULSION INTRAVENOUS at 12:05

## 2023-11-03 RX ADMIN — PROPOFOL 60 MG: 10 INJECTION, EMULSION INTRAVENOUS at 12:30

## 2023-11-03 RX ADMIN — PROPOFOL 50 MG: 10 INJECTION, EMULSION INTRAVENOUS at 12:40

## 2023-11-03 RX ADMIN — PROPOFOL 50 MG: 10 INJECTION, EMULSION INTRAVENOUS at 12:15

## 2023-11-03 RX ADMIN — LIDOCAINE HYDROCHLORIDE 100 MG: 20 INJECTION, SOLUTION INFILTRATION; PERINEURAL at 12:04

## 2023-11-03 RX ADMIN — PROPOFOL 60 MG: 10 INJECTION, EMULSION INTRAVENOUS at 12:25

## 2023-11-03 RX ADMIN — PROPOFOL 50 MG: 10 INJECTION, EMULSION INTRAVENOUS at 12:20

## 2023-11-03 RX ADMIN — SODIUM CHLORIDE, POTASSIUM CHLORIDE, SODIUM LACTATE AND CALCIUM CHLORIDE: 600; 310; 30; 20 INJECTION, SOLUTION INTRAVENOUS at 11:30
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
## 2023-11-03 NOTE — ANESTHESIA POSTPROCEDURE EVALUATION
Patient: Freda Castañeda    Procedure Summary       Date: 11/03/23 Room / Location: [facility] ENDOSCOPY 1 /  [facility]    Anesthesia Start: 1158 Anesthesia Stop: 1247    Procedures:       ESOPHAGOGASTRODUODENOSCOPY WITH BIOPSY and polypectomy (Esophagus)      COLONOSCOPY WITH POLYPECTOMY Diagnosis:       Gastroesophageal reflux disease, unspecified whether esophagitis present      Personal history of colonic polyps      (Gastroesophageal reflux disease, unspecified whether esophagitis present [K21.9])      (Personal history of colonic polyps [Z86.010])    Surgeons: Kimani Sharp MD Provider: Ken Dhillon CRNA    Anesthesia Type: MAC ASA Status: 2            Anesthesia Type: MAC    Vitals  Vitals Value Taken Time   /86 11/03/23 1315   Temp     Pulse 76 11/03/23 1315   Resp 12 11/03/23 1310   SpO2 100 % 11/03/23 1315   Vitals shown include unfiled device data.        Post Anesthesia Care and Evaluation    Patient location during evaluation: PHASE II  Patient participation: complete - patient participated  Level of consciousness: awake and alert  Pain score: 0  Pain management: adequate    Airway patency: patent  Anesthetic complications: No anesthetic complications  PONV Status: none  Cardiovascular status: acceptable  Respiratory status: acceptable  Hydration status: acceptable    Comments: Late entry

## 2023-11-03 NOTE — ANESTHESIA PREPROCEDURE EVALUATION
Anesthesia Evaluation     Patient summary reviewed and Nursing notes reviewed   no history of anesthetic complications:   NPO Solid Status: > 8 hours  NPO Liquid Status: > 8 hours           Airway   Mallampati: II  TM distance: >3 FB  Neck ROM: full  No difficulty expected  Dental      Comment: Multiple crowns, nothing loose per pt    Pulmonary - negative pulmonary ROS and normal exam   Cardiovascular - normal exam  Exercise tolerance: good (4-7 METS)    (+) hyperlipidemia      Neuro/Psych- negative ROS  GI/Hepatic/Renal/Endo    (+) morbid obesity, GERD well controlled    Musculoskeletal     Abdominal  - normal exam   Substance History - negative use     OB/GYN          Other   arthritis (hands),                       Anesthesia Plan    ASA 2     MAC   total IV anesthesia  intravenous induction     Anesthetic plan, risks, benefits, and alternatives have been provided, discussed and informed consent has been obtained with: patient.    Use of blood products discussed with patient .        CODE STATUS:

## 2023-11-06 LAB
LAB AP CASE REPORT: NORMAL
PATH REPORT.FINAL DX SPEC: NORMAL
PATH REPORT.GROSS SPEC: NORMAL

## 2023-11-09 NOTE — PROGRESS NOTES
- EGD for GERD   - Findings/path: Grade A esophagitis, gastritis (biopsy negative for H Pylori), three 8 to 12 mm gastric polyps removed (path -- fundic gland polyps aka benign)  - POC: Etiology of GERD is likely due to Grade A esophagitis seen. Please continue Omeprazole 40 mg QD to treat the esophagitis.    - C/s for personal history of colon polyps.   - Findings/path: a sub-cm TA removed   - POC: repeat c/s in 7 years

## 2023-11-15 ENCOUNTER — TELEPHONE (OUTPATIENT)
Dept: INTERNAL MEDICINE | Facility: CLINIC | Age: 73
End: 2023-11-15
Payer: MEDICARE

## 2023-11-16 DIAGNOSIS — R73.01 IFG (IMPAIRED FASTING GLUCOSE): ICD-10-CM

## 2023-11-16 DIAGNOSIS — E78.2 MIXED HYPERLIPIDEMIA: Primary | ICD-10-CM

## 2023-11-16 DIAGNOSIS — K58.1 IRRITABLE BOWEL SYNDROME WITH CONSTIPATION: ICD-10-CM

## 2023-11-16 DIAGNOSIS — K21.00 GASTROESOPHAGEAL REFLUX DISEASE WITH ESOPHAGITIS WITHOUT HEMORRHAGE: ICD-10-CM

## 2023-11-16 PROBLEM — R05.1 ACUTE COUGH: Status: RESOLVED | Noted: 2022-11-29 | Resolved: 2023-11-16

## 2023-11-29 ENCOUNTER — OFFICE VISIT (OUTPATIENT)
Dept: INTERNAL MEDICINE | Facility: CLINIC | Age: 73
End: 2023-11-29
Payer: MEDICARE

## 2023-11-29 VITALS
BODY MASS INDEX: 28.07 KG/M2 | HEART RATE: 77 BPM | TEMPERATURE: 97.5 F | DIASTOLIC BLOOD PRESSURE: 84 MMHG | SYSTOLIC BLOOD PRESSURE: 142 MMHG | WEIGHT: 164.4 LBS | HEIGHT: 64 IN | OXYGEN SATURATION: 100 %

## 2023-11-29 DIAGNOSIS — N81.2 CYSTOCELE WITH INCOMPLETE UTEROVAGINAL PROLAPSE: ICD-10-CM

## 2023-11-29 DIAGNOSIS — R73.01 IFG (IMPAIRED FASTING GLUCOSE): ICD-10-CM

## 2023-11-29 DIAGNOSIS — E78.2 MIXED HYPERLIPIDEMIA: ICD-10-CM

## 2023-11-29 DIAGNOSIS — Z00.00 ENCOUNTER FOR ANNUAL WELLNESS VISIT (AWV) IN MEDICARE PATIENT: Primary | ICD-10-CM

## 2023-11-29 DIAGNOSIS — Z12.4 PAP SMEAR FOR CERVICAL CANCER SCREENING: ICD-10-CM

## 2023-11-29 DIAGNOSIS — K21.00 GASTROESOPHAGEAL REFLUX DISEASE WITH ESOPHAGITIS WITHOUT HEMORRHAGE: ICD-10-CM

## 2023-11-29 RX ORDER — ROSUVASTATIN CALCIUM 10 MG/1
10 TABLET, COATED ORAL DAILY
Qty: 90 TABLET | Refills: 3 | Status: SHIPPED | OUTPATIENT
Start: 2023-11-29

## 2023-11-29 NOTE — PROGRESS NOTES
The ABCs of the Annual Wellness Visit  Subsequent Medicare Wellness Visit    Subjective    Freda Castañeda is a 73 y.o. female who presents for a Subsequent Medicare Wellness Visit.    The following portions of the patient's history were reviewed and   updated as appropriate: allergies, current medications, past family history, past medical history, past social history, past surgical history, and problem list.    Compared to one year ago, the patient feels her physical   health is the same.    Compared to one year ago, the patient feels her mental   health is the same.    Recent Hospitalizations:  She was not admitted to the hospital during the last year.       Current Medical Providers:  Patient Care Team:  Berenice Morton APRN as PCP - General (Family Medicine)    Outpatient Medications Prior to Visit   Medication Sig Dispense Refill   • Cholecalciferol (VITAMIN D3) 2000 UNITS tablet Take 1 tablet by mouth Daily.     • cycloSPORINE (RESTASIS) 0.05 % ophthalmic emulsion Apply  to eye(s) as directed by provider Every 12 (Twelve) Hours.     • fluticasone (FLONASE) 50 MCG/ACT nasal spray 1 spray into the nostril(s) as directed by provider Daily.     • loratadine (CLARITIN) 10 MG tablet Take  by mouth daily.     • Omega-3 Fatty Acids (FISH OIL) 1000 MG capsule capsule Take  by mouth daily.     • omeprazole (priLOSEC) 40 MG capsule Take 1 capsule by mouth Daily. 90 capsule 3   • rosuvastatin (CRESTOR) 10 MG tablet TAKE 1 TABLET DAILY 90 tablet 3   • Timolol Maleate PF 0.5 % solution Apply 1 drop to eye(s) as directed by provider Daily.     • acetaminophen (TYLENOL) 650 MG 8 hr tablet Take 2 tablets by mouth.       No facility-administered medications prior to visit.       No opioid medication identified on active medication list. I have reviewed chart for other potential  high risk medication/s and harmful drug interactions in the elderly.        Aspirin is not on active medication list.  Aspirin use is contraindicated  "for this patient due to: Recebt EGD showing gastritis.  .    Patient Active Problem List   Diagnosis   • Arthritis   • Hematochezia   • Gastroesophageal reflux disease   • Hyperlipidemia   • Polyarteritis nodosa   • Arthritis of temporomandibular joint   • Menopause   • Screening for ovarian cancer   • Obesity (BMI 30-39.9)   • IFG (impaired fasting glucose)   • Gross hematuria   • Acute cystitis without hematuria   • Abdominal bloating   • Dyspepsia   • Other intra-abdominal and pelvic swelling, mass and lump   • Irritable bowel syndrome with constipation   • Personal history of colonic polyps     Advance Care Planning   Advance Care Planning     Advance Directive is not on file.  ACP discussion was held with the patient during this visit. Patient has an advance directive (not in EMR), copy requested.     Objective    Vitals:    11/29/23 1136   BP: 142/84   BP Location: Left arm   Patient Position: Sitting   Cuff Size: Adult   Pulse: 77   Temp: 97.5 °F (36.4 °C)   TempSrc: Infrared   SpO2: 100%   Weight: 74.6 kg (164 lb 6.4 oz)   Height: 162.6 cm (64.02\")     Estimated body mass index is 28.21 kg/m² as calculated from the following:    Height as of this encounter: 162.6 cm (64.02\").    Weight as of this encounter: 74.6 kg (164 lb 6.4 oz).    BMI is >= 25 and <30. (Overweight) The following options were offered after discussion;: exercise counseling/recommendations      Does the patient have evidence of cognitive impairment? No    Lab Results   Component Value Date    CHLPL 158 11/22/2023    TRIG 153 (H) 11/22/2023    HDL 41 11/22/2023    LDL 90 11/22/2023    VLDL 27 11/22/2023    HGBA1C 5.70 (H) 11/22/2023        HEALTH RISK ASSESSMENT    Smoking Status:  Social History     Tobacco Use   Smoking Status Never   Smokeless Tobacco Never     Alcohol Consumption:  Social History     Substance and Sexual Activity   Alcohol Use No     Fall Risk Screen:    EUGENIA Fall Risk Assessment was completed, and patient is at LOW " risk for falls.Assessment completed on:2023    Depression Screenin/29/2023    11:38 AM   PHQ-2/PHQ-9 Depression Screening   Little Interest or Pleasure in Doing Things 0-->not at all   Feeling Down, Depressed or Hopeless 0-->not at all   PHQ-9: Brief Depression Severity Measure Score 0       Health Habits and Functional and Cognitive Screenin/29/2023    11:00 AM   Functional & Cognitive Status   Do you have difficulty preparing food and eating? No   Do you have difficulty bathing yourself, getting dressed or grooming yourself? No   Do you have difficulty using the toilet? No   Do you have difficulty moving around from place to place? No   Do you have trouble with steps or getting out of a bed or a chair? No   Current Diet Well Balanced Diet   Dental Exam Up to date   Eye Exam Up to date   Exercise (times per week) 0 times per week   Current Exercises Include Walking   Do you need help using the phone?  No   Are you deaf or do you have serious difficulty hearing?  No   Do you need help to go to places out of walking distance? No   Do you need help shopping? No   Do you need help preparing meals?  No   Do you need help with housework?  No   Do you need help with laundry? No   Do you need help taking your medications? No   Do you need help managing money? No   Do you ever drive or ride in a car without wearing a seat belt? No   Have you felt unusual stress, anger or loneliness in the last month? No   Who do you live with? Spouse   If you need help, do you have trouble finding someone available to you? No   Do you have difficulty concentrating, remembering or making decisions? No       Age-appropriate Screening Schedule:  Refer to the list below for future screening recommendations based on patient's age, sex and/or medical conditions. Orders for these recommended tests are listed in the plan section. The patient has been provided with a written plan.    Health Maintenance   Topic Date Due   •  ZOSTER VACCINE (2 of 2) 05/18/2017   • INFLUENZA VACCINE  08/01/2023   • COVID-19 Vaccine (3 - 2023-24 season) 09/01/2023   • ANNUAL WELLNESS VISIT  11/29/2023   • BMI FOLLOWUP  11/29/2023   • PAP SMEAR  04/20/2025 (Originally 7/8/2018)   • MAMMOGRAM  03/30/2024   • LIPID PANEL  11/22/2024   • DXA SCAN  12/19/2024   • TDAP/TD VACCINES (2 - Td or Tdap) 03/23/2027   • COLORECTAL CANCER SCREENING  11/03/2028   • HEPATITIS C SCREENING  Completed   • Pneumococcal Vaccine 65+  Completed                  CMS Preventative Services Quick Reference  Risk Factors Identified During Encounter  Immunizations Discussed/Encouraged: Influenza, Prevnar 20 (Pneumococcal 20-valent conjugate), Shingrix, COVID19, and RSV (Respiratory Syncytial Virus)  Inactivity/Sedentary: Patient was advised to exercise at least 150 minutes a week per CDC recommendations.  The above risks/problems have been discussed with the patient.  Pertinent information has been shared with the patient in the After Visit Summary.  An After Visit Summary and PPPS were made available to the patient.    Follow Up:   Next Medicare Wellness visit to be scheduled in 1 year.       Additional E&M Note during same encounter follows:  Patient has multiple medical problems which are significant and separately identifiable that require additional work above and beyond the Medicare Wellness Visit.      Chief Complaint  Medicare Wellness-subsequent    Subjective        HPI  Freda Castañeda is also being seen today for hyperlipidemia and IFG. She is on Crestor 10mg daily. She has 2 new complaints as well.        Review of Systems   Constitutional: Negative.    HENT: Negative.     Eyes: Negative.    Respiratory: Negative.     Cardiovascular: Negative.  Negative for chest pain, palpitations and leg swelling.   Gastrointestinal: Negative.    Endocrine: Negative.    Genitourinary: Negative.    Musculoskeletal: Negative.  Negative for myalgias.   Allergic/Immunologic: Negative.   "Negative for environmental allergies and food allergies.   Neurological: Negative.    Hematological:  Negative for adenopathy. Does not bruise/bleed easily.   Psychiatric/Behavioral: Negative.     All other systems reviewed and are negative.      Objective   Vital Signs:  /84 (BP Location: Left arm, Patient Position: Sitting, Cuff Size: Adult)   Pulse 77   Temp 97.5 °F (36.4 °C) (Infrared)   Ht 162.6 cm (64.02\")   Wt 74.6 kg (164 lb 6.4 oz)   SpO2 100%   BMI 28.21 kg/m²     Physical Exam  Vitals reviewed. Exam conducted with a chaperone present.   Constitutional:       Appearance: Normal appearance. She is not ill-appearing.   HENT:      Head: Normocephalic.      Right Ear: Tympanic membrane normal.      Left Ear: Tympanic membrane normal.   Cardiovascular:      Rate and Rhythm: Normal rate and regular rhythm.      Pulses: Normal pulses.      Heart sounds: Normal heart sounds. No murmur heard.  Pulmonary:      Effort: Pulmonary effort is normal. No respiratory distress.      Breath sounds: Normal breath sounds. No stridor.   Abdominal:      Hernia: There is no hernia in the left inguinal area or right inguinal area.   Genitourinary:     Pubic Area: No rash.       Labia:         Right: No rash or tenderness.         Left: No rash or tenderness.       Comments: Grade 3 cystocele with pelvic floor prolapse  Musculoskeletal:      Cervical back: Neck supple.      Right lower leg: No edema.      Left lower leg: No edema.   Lymphadenopathy:      Lower Body: No right inguinal adenopathy. No left inguinal adenopathy.   Skin:     General: Skin is warm and dry.   Neurological:      General: No focal deficit present.      Mental Status: She is alert and oriented to person, place, and time.   Psychiatric:         Mood and Affect: Mood normal.         Behavior: Behavior normal.         Thought Content: Thought content normal.          The following data was reviewed by: KAYLEE Clark on 11/29/2023:  CMP          " 9/6/2023    11:47 11/22/2023    10:59   CMP   Glucose 108  97    BUN 19  12    Creatinine 0.55  0.51    Sodium 139  140    Potassium 4.7  4.7    Chloride 104  105    Calcium 10.2  9.4    Total Protein 6.9  6.4    Albumin 5.0  4.8    Globulin 1.9  1.6    Total Bilirubin 1.2  0.9    Alkaline Phosphatase 87  81    AST (SGOT) 16  19    ALT (SGPT) 14  15    BUN/Creatinine Ratio 34.5  23.5      CBC          9/6/2023    11:47 11/22/2023    10:59   CBC   WBC 5.05  5.20    RBC 4.63  4.35    Hemoglobin 14.2  14.0    Hematocrit 43.0  40.5    MCV 92.9  93.1    MCH 30.7  32.2    MCHC 33.0  34.6    RDW 13.5  12.8    Platelets 322  306      CBC w/diff          9/6/2023    11:47 11/22/2023    10:59   CBC w/Diff   WBC 5.05  5.20    RBC 4.63  4.35    Hemoglobin 14.2  14.0    Hematocrit 43.0  40.5    MCV 92.9  93.1    MCH 30.7  32.2    MCHC 33.0  34.6    RDW 13.5  12.8    Platelets 322  306    Neutrophil Rel % 53.5  49.7    Lymphocyte Rel % 35.2  37.9    Monocyte Rel % 9.1  10.2    Eosinophil Rel % 1.2  1.2    Basophil Rel % 0.6  0.8      Lipid Panel          11/22/2023    10:59   Lipid Panel   Total Cholesterol 158    Triglycerides 153    HDL Cholesterol 41    VLDL Cholesterol 27    LDL Cholesterol  90      A1C Last 3 Results          11/22/2023    10:59   HGBA1C Last 3 Results   Hemoglobin A1C 5.70        Data reviewed : Consultant notes GI and GI studies EGD and colonoscopy           Assessment and Plan   There are no diagnoses linked to this encounter.   Diagnosis Plan   1. Encounter for annual wellness visit (AWV) in Medicare patient        2. Mixed hyperlipidemia  rosuvastatin (CRESTOR) 10 MG tablet    CBC & Differential    Comprehensive Metabolic Panel    Lipid Panel With / Chol / HDL Ratio    LDL goal < 100 on Crestor 10mg nightly      3. Cystocele with incomplete uterovaginal prolapse  Ambulatory Referral to Gynecologic Urology    Discussed surgical verses non surgical options (pessary and Jauregui PT)      4. IFG (impaired  fasting glucose)  CBC & Differential    Comprehensive Metabolic Panel    Hemoglobin A1c    Hgb A1c at goal      5. Gastroesophageal reflux disease with esophagitis without hemorrhage  CBC & Differential    Comprehensive Metabolic Panel    omeprazole 40mg daily      6. Pap smear for cervical cancer screening  Pap IG (Image Guided)              Follow Up   Once yearly  Patient was given instructions and counseling regarding her condition or for health maintenance advice. Please see specific information pulled into the AVS if appropriate.

## 2023-12-01 LAB
CYTOLOGIST CVX/VAG CYTO: NORMAL
CYTOLOGY CVX/VAG DOC CYTO: NORMAL
CYTOLOGY CVX/VAG DOC THIN PREP: NORMAL
DX ICD CODE: NORMAL
HIV 1 & 2 AB SER-IMP: NORMAL
OTHER STN SPEC: NORMAL
STAT OF ADQ CVX/VAG CYTO-IMP: NORMAL

## 2024-03-12 ENCOUNTER — TRANSCRIBE ORDERS (OUTPATIENT)
Dept: ADMINISTRATIVE | Facility: HOSPITAL | Age: 74
End: 2024-03-12
Payer: MEDICARE

## 2024-03-12 DIAGNOSIS — Z12.31 SCREENING MAMMOGRAM, ENCOUNTER FOR: Primary | ICD-10-CM

## 2024-04-10 ENCOUNTER — HOSPITAL ENCOUNTER (OUTPATIENT)
Dept: MAMMOGRAPHY | Facility: HOSPITAL | Age: 74
Discharge: HOME | End: 2024-04-10
Admitting: NURSE PRACTITIONER
Payer: MEDICARE

## 2024-04-10 DIAGNOSIS — Z12.31 SCREENING MAMMOGRAM, ENCOUNTER FOR: ICD-10-CM

## 2024-04-10 PROCEDURE — 77067 SCR MAMMO BI INCL CAD: CPT

## 2024-04-10 PROCEDURE — 77063 BREAST TOMOSYNTHESIS BI: CPT

## 2024-04-10 PROCEDURE — 77067 SCR MAMMO BI INCL CAD: CPT | Performed by: RADIOLOGY

## 2024-04-10 PROCEDURE — 77063 BREAST TOMOSYNTHESIS BI: CPT | Performed by: RADIOLOGY

## 2024-08-07 DIAGNOSIS — K21.9 GASTROESOPHAGEAL REFLUX DISEASE, UNSPECIFIED WHETHER ESOPHAGITIS PRESENT: ICD-10-CM

## 2024-08-08 RX ORDER — OMEPRAZOLE 40 MG/1
40 CAPSULE, DELAYED RELEASE ORAL DAILY
Qty: 90 CAPSULE | Refills: 0 | Status: SHIPPED | OUTPATIENT
Start: 2024-08-08

## 2024-11-16 DIAGNOSIS — K21.9 GASTROESOPHAGEAL REFLUX DISEASE, UNSPECIFIED WHETHER ESOPHAGITIS PRESENT: ICD-10-CM

## 2024-11-18 RX ORDER — OMEPRAZOLE 40 MG/1
40 CAPSULE, DELAYED RELEASE ORAL DAILY
Qty: 90 CAPSULE | Refills: 3 | OUTPATIENT
Start: 2024-11-18

## 2024-12-04 ENCOUNTER — OFFICE VISIT (OUTPATIENT)
Dept: INTERNAL MEDICINE | Facility: CLINIC | Age: 74
End: 2024-12-04
Payer: MEDICARE

## 2024-12-04 VITALS
HEIGHT: 64 IN | OXYGEN SATURATION: 98 % | TEMPERATURE: 99.1 F | WEIGHT: 168.4 LBS | DIASTOLIC BLOOD PRESSURE: 94 MMHG | BODY MASS INDEX: 28.75 KG/M2 | HEART RATE: 78 BPM | SYSTOLIC BLOOD PRESSURE: 144 MMHG

## 2024-12-04 DIAGNOSIS — E78.2 MIXED HYPERLIPIDEMIA: ICD-10-CM

## 2024-12-04 DIAGNOSIS — R03.0 ELEVATED BLOOD PRESSURE READING: ICD-10-CM

## 2024-12-04 DIAGNOSIS — N81.89 PELVIC FLOOR RELAXATION: ICD-10-CM

## 2024-12-04 DIAGNOSIS — K58.1 IRRITABLE BOWEL SYNDROME WITH CONSTIPATION: ICD-10-CM

## 2024-12-04 DIAGNOSIS — Z00.00 ENCOUNTER FOR ANNUAL WELLNESS VISIT (AWV) IN MEDICARE PATIENT: Primary | ICD-10-CM

## 2024-12-04 DIAGNOSIS — R73.01 IFG (IMPAIRED FASTING GLUCOSE): ICD-10-CM

## 2024-12-04 DIAGNOSIS — K21.9 GASTROESOPHAGEAL REFLUX DISEASE, UNSPECIFIED WHETHER ESOPHAGITIS PRESENT: ICD-10-CM

## 2024-12-04 PROBLEM — R19.09 OTHER INTRA-ABDOMINAL AND PELVIC SWELLING, MASS AND LUMP: Status: RESOLVED | Noted: 2023-09-06 | Resolved: 2024-12-04

## 2024-12-04 PROBLEM — N30.00 ACUTE CYSTITIS WITHOUT HEMATURIA: Status: RESOLVED | Noted: 2023-09-06 | Resolved: 2024-12-04

## 2024-12-04 PROBLEM — R31.0 GROSS HEMATURIA: Status: RESOLVED | Noted: 2022-11-29 | Resolved: 2024-12-04

## 2024-12-04 PROCEDURE — G0439 PPPS, SUBSEQ VISIT: HCPCS | Performed by: NURSE PRACTITIONER

## 2024-12-04 PROCEDURE — 99214 OFFICE O/P EST MOD 30 MIN: CPT | Performed by: NURSE PRACTITIONER

## 2024-12-04 PROCEDURE — 1125F AMNT PAIN NOTED PAIN PRSNT: CPT | Performed by: NURSE PRACTITIONER

## 2024-12-04 PROCEDURE — 1170F FXNL STATUS ASSESSED: CPT | Performed by: NURSE PRACTITIONER

## 2024-12-04 RX ORDER — OMEPRAZOLE 40 MG/1
40 CAPSULE, DELAYED RELEASE ORAL DAILY
Qty: 90 CAPSULE | Refills: 3 | Status: SHIPPED | OUTPATIENT
Start: 2024-12-04

## 2024-12-04 RX ORDER — LOTEPREDNOL ETABONATE 5 MG/G
GEL OPHTHALMIC
COMMUNITY
Start: 2024-08-02

## 2024-12-04 RX ORDER — CYCLOSPORINE 0 G/ML
SOLUTION/ DROPS OPHTHALMIC; TOPICAL
COMMUNITY
Start: 2024-11-25

## 2024-12-04 RX ORDER — ALUMINUM ZIRCONIUM OCTACHLOROHYDREX GLY 16 G/100G
4 GEL TOPICAL DAILY
Start: 2024-12-04

## 2024-12-04 NOTE — ASSESSMENT & PLAN NOTE
Orders:    CBC & Differential; Future    Comprehensive Metabolic Panel; Future    Lipid Panel With / Chol / HDL Ratio; Future

## 2024-12-04 NOTE — PROGRESS NOTES
Subjective   The ABCs of the Annual Wellness Visit  Medicare Wellness Visit      Freda Castañeda is a 74 y.o. patient who presents for a Medicare Wellness Visit.    The following portions of the patient's history were reviewed and   updated as appropriate: allergies, current medications, past family history, past medical history, past social history, past surgical history, and problem list.    Compared to one year ago, the patient's physical   health is the same.  Compared to one year ago, the patient's mental   health is the same.    Recent Hospitalizations:  She was not admitted to the hospital during the last year.     Current Medical Providers:  Patient Care Team:  Berenice Morton APRN as PCP - General (Family Medicine)  Kimani Sharp MD as Consulting Physician (Gastroenterology)  Wilda Patterson OD (Optometry)    Outpatient Medications Prior to Visit   Medication Sig Dispense Refill    acetaminophen (TYLENOL) 650 MG 8 hr tablet Take 2 tablets by mouth.      Cequa 0.09 % solution       Cholecalciferol (VITAMIN D3) 2000 UNITS tablet Take 1 tablet by mouth Daily.      fluticasone (FLONASE) 50 MCG/ACT nasal spray Administer 1 spray into the nostril(s) as directed by provider Daily.      loratadine (CLARITIN) 10 MG tablet Take  by mouth daily.      loteprednol etabonate (Lotemax) 0.5 % gel ophthalmic gel instill 1 drop by ophthalmic route 2 times every day in to affected eye(s)      Omega-3 Fatty Acids (FISH OIL) 1000 MG capsule capsule Take 2,150 mg by mouth Daily.      omeprazole (priLOSEC) 40 MG capsule TAKE 1 CAPSULE DAILY (Patient taking differently: Take 20 mg by mouth Daily.) 90 capsule 0    rosuvastatin (CRESTOR) 10 MG tablet Take 1 tablet by mouth Daily. 90 tablet 3    Timolol Maleate PF 0.5 % solution Apply 1 drop to eye(s) as directed by provider Daily.      cycloSPORINE (RESTASIS) 0.05 % ophthalmic emulsion Apply  to eye(s) as directed by provider Every 12 (Twelve) Hours.       No  "facility-administered medications prior to visit.     No opioid medication identified on active medication list. I have reviewed chart for other potential  high risk medication/s and harmful drug interactions in the elderly.      Aspirin is not on active medication list.  Aspirin use is not indicated based on review of current medical condition/s. Risk of harm outweighs potential benefits.  .    Patient Active Problem List   Diagnosis    Arthritis    Hematochezia    Gastroesophageal reflux disease    Hyperlipidemia    Polyarteritis nodosa    Arthritis of temporomandibular joint    Pap smear for cervical cancer screening    Menopause    Encounter for annual wellness visit (AWV) in Medicare patient    Obesity (BMI 30-39.9)    IFG (impaired fasting glucose)    Gross hematuria    Acute cystitis without hematuria    Abdominal bloating    Dyspepsia    Other intra-abdominal and pelvic swelling, mass and lump    Irritable bowel syndrome with constipation    Personal history of colonic polyps    Cystocele with incomplete uterovaginal prolapse     Advance Care Planning Advance Directive is not on file.  ACP discussion was held with the patient during this visit. Patient has an advance directive (not in EMR), copy requested.            Objective   Vitals:    12/04/24 1006   BP: 144/94   BP Location: Left arm   Patient Position: Sitting   Cuff Size: Adult   Pulse: 78   Temp: 99.1 °F (37.3 °C)   TempSrc: Infrared   SpO2: 98%   Weight: 76.4 kg (168 lb 6.4 oz)   Height: 162.6 cm (64.02\")   PainSc:   6   PainLoc: Back       Estimated body mass index is 28.89 kg/m² as calculated from the following:    Height as of this encounter: 162.6 cm (64.02\").    Weight as of this encounter: 76.4 kg (168 lb 6.4 oz).    BMI is >= 25 and <30. (Overweight) The following options were offered after discussion;: exercise counseling/recommendations and nutrition counseling/recommendations       Does the patient have evidence of cognitive impairment? " No  Lab Results   Component Value Date    CHLPL 151 2024    TRIG 171 (H) 2024    HDL 41 2024    LDL 81 2024    VLDL 29 2024    HGBA1C 5.50 2024                                                                                                Health  Risk Assessment    Smoking Status:  Social History     Tobacco Use   Smoking Status Never    Passive exposure: Never   Smokeless Tobacco Never     Alcohol Consumption:  Social History     Substance and Sexual Activity   Alcohol Use No       Fall Risk Screen  STEADI Fall Risk Assessment was completed, and patient is at LOW risk for falls.Assessment completed on:2024    Depression Screening   Little interest or pleasure in doing things? Not at all   Feeling down, depressed, or hopeless? Not at all   PHQ-2 Total Score 0      Health Habits and Functional and Cognitive Screenin/4/2024     9:53 AM   Functional & Cognitive Status   Do you have difficulty preparing food and eating? No   Do you have difficulty bathing yourself, getting dressed or grooming yourself? No   Do you have difficulty using the toilet? No   Do you have difficulty moving around from place to place? No   Do you have trouble with steps or getting out of a bed or a chair? No   Current Diet Other   Dental Exam Up to date   Eye Exam Up to date   Exercise (times per week) 5 times per week   Current Exercises Include House Cleaning;Walking;Yard Work   Do you need help using the phone?  No   Are you deaf or do you have serious difficulty hearing?  No   Do you need help to go to places out of walking distance? No   Do you need help shopping? No   Do you need help preparing meals?  No   Do you need help with housework?  No   Do you need help with laundry? No   Do you need help taking your medications? No   Do you need help managing money? No   Do you ever drive or ride in a car without wearing a seat belt? No   Have you felt unusual stress, anger or loneliness in the  last month? No   Who do you live with? Spouse   If you need help, do you have trouble finding someone available to you? No   Have you been bothered in the last four weeks by sexual problems? No   Do you have difficulty concentrating, remembering or making decisions? No           Age-appropriate Screening Schedule:  Refer to the list below for future screening recommendations based on patient's age, sex and/or medical conditions. Orders for these recommended tests are listed in the plan section. The patient has been provided with a written plan.    Health Maintenance List  Health Maintenance   Topic Date Due    ANNUAL WELLNESS VISIT  11/29/2024    BMI FOLLOWUP  11/29/2024    DXA SCAN  12/19/2024    COVID-19 Vaccine (3 - 2024-25 season) 12/06/2024 (Originally 9/1/2024)    INFLUENZA VACCINE  03/31/2025 (Originally 7/1/2024)    MAMMOGRAM  04/10/2025    LIPID PANEL  11/26/2025    PAP SMEAR  11/29/2026    TDAP/TD VACCINES (2 - Td or Tdap) 03/23/2027    COLORECTAL CANCER SCREENING  11/03/2028    HEPATITIS C SCREENING  Completed    Pneumococcal Vaccine 65+  Completed    ZOSTER VACCINE  Discontinued                                                                                                                                                CMS Preventative Services Quick Reference  Risk Factors Identified During Encounter  Immunizations Discussed/Encouraged: Influenza  Inactivity/Sedentary: Patient was advised to exercise at least 150 minutes a week per CDC recommendations.    The above risks/problems have been discussed with the patient.  Pertinent information has been shared with the patient in the After Visit Summary.  An After Visit Summary and PPPS were made available to the patient.    Follow Up:   Next Medicare Wellness visit to be scheduled in 1 year.         Additional E&M Note during same encounter follows:  Patient has additional, significant, and separately identifiable condition(s)/problem(s) that require work  "above and beyond the Medicare Wellness Visit     Chief Complaint  Medicare Wellness-subsequent    Subjective   HPI  Freda is also being seen today for additional medical problem/s.    Review of Systems   Constitutional: Negative.    HENT:  Positive for congestion.    Respiratory: Negative.     Endocrine: Negative.    Genitourinary:  Positive for difficulty urinating and frequency.   Musculoskeletal:  Positive for back pain.   Allergic/Immunologic: Positive for environmental allergies.   All other systems reviewed and are negative.     She is being seen for hyperlipidemia and IFG as well. She is currently on Crestor 10mg nightly.    She had an EGD and colonoscopy Nov 2023 with Dr Sharp. Results noted:     EGD for GERD   - Findings/path: Grade A esophagitis, gastritis (biopsy negative for H Pylori), three 8 to 12 mm gastric polyps removed (path -- fundic gland polyps aka benign)  - POC: Etiology of GERD is likely due to Grade A esophagitis seen. Please continue Omeprazole 40 mg QD to treat the esophagitis.     - C/s for personal history of colon polyps.   - Findings/path: a sub-cm TA removed   - POC: repeat c/s in 7 years       She ran out of 40mg omeprazole and is on OTC 20mg. Her Burning sensation in her chest returned.    She has a cystocele and was referred to Urogyn, but declined to go. She still has intermittent difficulty with urination.         Objective   Vital Signs:  /94 (BP Location: Left arm, Patient Position: Sitting, Cuff Size: Adult)   Pulse 78   Temp 99.1 °F (37.3 °C) (Infrared)   Ht 162.6 cm (64.02\")   Wt 76.4 kg (168 lb 6.4 oz)   SpO2 98%   BMI 28.89 kg/m²   Physical Exam  Vitals reviewed.   Constitutional:       Appearance: Normal appearance.   HENT:      Head: Normocephalic.      Right Ear: Tympanic membrane normal.      Left Ear: Tympanic membrane normal.      Nose: No congestion or rhinorrhea.   Cardiovascular:      Rate and Rhythm: Normal rate and regular rhythm.      Pulses: " Normal pulses.      Heart sounds: Normal heart sounds.   Pulmonary:      Effort: Pulmonary effort is normal. No respiratory distress.      Breath sounds: Normal breath sounds. No stridor.   Musculoskeletal:      Cervical back: Neck supple. No tenderness.      Right lower leg: No edema.      Left lower leg: No edema.   Skin:     General: Skin is warm and dry.      Findings: No lesion.   Neurological:      General: No focal deficit present.      Mental Status: She is alert and oriented to person, place, and time.      Gait: Gait normal.   Psychiatric:         Mood and Affect: Mood normal.         Behavior: Behavior normal.         Thought Content: Thought content normal.         The following data was reviewed by: KAYLEE Clark on 12/04/2024:  Data reviewed : Radiologic studies DXA scan, ECG Mammogram  Common labs          11/26/2024    10:08   Common Labs   Glucose 95    BUN 13    Creatinine 0.54    Sodium 141    Potassium 4.6    Chloride 103    Calcium 9.3    Total Protein 6.3    Albumin 4.3    Total Bilirubin 1.0    Alkaline Phosphatase 90    AST (SGOT) 15    ALT (SGPT) 15    WBC 5.37    Hemoglobin 14.0    Hematocrit 41.4    Platelets 312    Total Cholesterol 151    Triglycerides 171    HDL Cholesterol 41    LDL Cholesterol  81    Hemoglobin A1C 5.50      CMP          11/26/2024    10:08   CMP   Glucose 95    BUN 13    Creatinine 0.54    Sodium 141    Potassium 4.6    Chloride 103    Calcium 9.3    Total Protein 6.3    Albumin 4.3    Globulin 2.0    Total Bilirubin 1.0    Alkaline Phosphatase 90    AST (SGOT) 15    ALT (SGPT) 15    BUN/Creatinine Ratio 24.1      CBC          11/26/2024    10:08   CBC   WBC 5.37    RBC 4.35    Hemoglobin 14.0    Hematocrit 41.4    MCV 95.2    MCH 32.2    MCHC 33.8    RDW 12.9    Platelets 312      CBC w/diff          11/26/2024    10:08   CBC w/Diff   WBC 5.37    RBC 4.35    Hemoglobin 14.0    Hematocrit 41.4    MCV 95.2    MCH 32.2    MCHC 33.8    RDW 12.9    Platelets 312     Neutrophil Rel % 45.2    Lymphocyte Rel % 42.5    Monocyte Rel % 10.1    Eosinophil Rel % 1.3    Basophil Rel % 0.7      Lipid Panel          11/26/2024    10:08   Lipid Panel   Total Cholesterol 151    Triglycerides 171    HDL Cholesterol 41    VLDL Cholesterol 29    LDL Cholesterol  81              Assessment and Plan            Encounter for annual wellness visit (AWV) in Medicare patient         Mixed hyperlipidemia       Orders:    CBC & Differential; Future    Comprehensive Metabolic Panel; Future    Lipid Panel With / Chol / HDL Ratio; Future    Gastroesophageal reflux disease, unspecified whether esophagitis present    Orders:    omeprazole (priLOSEC) 40 MG capsule; Take 1 capsule by mouth Daily.    Elevated blood pressure reading         IFG (impaired fasting glucose)    Orders:    Comprehensive Metabolic Panel; Future    Hemoglobin A1c; Future    Pelvic floor relaxation    Orders:    Ambulatory Referral to Physical Therapy for Evaluation & Treatment    Irritable bowel syndrome with constipation    Orders:    Probiotic Product (Align) 4 MG capsule; Take 4 mg by mouth Daily.     Diagnosis Plan   1. Encounter for annual wellness visit (AWV) in Medicare patient        2. Mixed hyperlipidemia  CBC & Differential    Comprehensive Metabolic Panel    Lipid Panel With / Chol / HDL Ratio    Chronic, Crestor 10mg nightly      3. Gastroesophageal reflux disease, unspecified whether esophagitis present  omeprazole (priLOSEC) 40 MG capsule    Resume Omeprazole 40mg daily      4. Elevated blood pressure reading      Low sodium diet < 2000mg daily. Keep a BP log at home.      5. IFG (impaired fasting glucose)  Comprehensive Metabolic Panel    Hemoglobin A1c    Resolved with low carb diet      6. Pelvic floor relaxation  Ambulatory Referral to Physical Therapy for Evaluation & Treatment    Refer to PT      7. Irritable bowel syndrome with constipation  Probiotic Product (Align) 4 MG capsule               I spent 45  minutes caring for Freda on this date of service. This time includes time spent by me in the following activities:preparing for the visit, reviewing tests, obtaining and/or reviewing a separately obtained history, performing a medically appropriate examination and/or evaluation , counseling and educating the patient/family/caregiver, ordering medications, tests, or procedures, referring and communicating with other health care professionals , and documenting information in the medical record  Follow Up   6 months w labs  Patient was given instructions and counseling regarding her condition or for health maintenance advice. Please see specific information pulled into the AVS if appropriate.

## 2024-12-04 NOTE — ASSESSMENT & PLAN NOTE
Orders:    Probiotic Product (Align) 4 MG capsule; Take 4 mg by mouth Daily.     Diagnosis Plan   1. Encounter for annual wellness visit (AWV) in Medicare patient        2. Mixed hyperlipidemia  CBC & Differential    Comprehensive Metabolic Panel    Lipid Panel With / Chol / HDL Ratio    Chronic, Crestor 10mg nightly      3. Gastroesophageal reflux disease, unspecified whether esophagitis present  omeprazole (priLOSEC) 40 MG capsule    Resume Omeprazole 40mg daily      4. Elevated blood pressure reading      Low sodium diet < 2000mg daily. Keep a BP log at home.      5. IFG (impaired fasting glucose)  Comprehensive Metabolic Panel    Hemoglobin A1c    Resolved with low carb diet      6. Pelvic floor relaxation  Ambulatory Referral to Physical Therapy for Evaluation & Treatment    Refer to PT      7. Irritable bowel syndrome with constipation  Probiotic Product (Align) 4 MG capsule

## 2024-12-17 DIAGNOSIS — E78.2 MIXED HYPERLIPIDEMIA: ICD-10-CM

## 2024-12-18 RX ORDER — ROSUVASTATIN CALCIUM 10 MG/1
10 TABLET, COATED ORAL DAILY
Qty: 90 TABLET | Refills: 3 | Status: SHIPPED | OUTPATIENT
Start: 2024-12-18

## 2024-12-18 NOTE — TELEPHONE ENCOUNTER
Rx Refill Note  Requested Prescriptions     Pending Prescriptions Disp Refills    rosuvastatin (CRESTOR) 10 MG tablet [Pharmacy Med Name: ROSUVASTATIN TABS 10MG] 90 tablet 3     Sig: TAKE 1 TABLET DAILY      Last office visit with prescribing clinician: 12/4/2024   Last telemedicine visit with prescribing clinician: Visit date not found   Next office visit with prescribing clinician: 12/12/2025                         Would you like a call back once the refill request has been completed: [] Yes [] No    If the office needs to give you a call back, can they leave a voicemail: [] Yes [] No    Ivonne Wiseman MA  12/18/24, 12:57 EST

## 2025-01-29 ENCOUNTER — TELEPHONE (OUTPATIENT)
Dept: INTERNAL MEDICINE | Facility: CLINIC | Age: 75
End: 2025-01-29

## 2025-01-29 NOTE — TELEPHONE ENCOUNTER
Caller: [facility]    Relationship: PHYSICAL THERAPY    Best call back number: [phone]    What was the call regarding: STATED THAT THE PATIENT IS SCHEDULED FOR TUESDAY BUT THEY DO NOT HAVE A COPY OF THE REFERRAL SO THEY WERE NEEDING TO GET ONE AS SOON AS THEY CAN. PLEASE CALL AND ADVISE     FAX NUMBER: [phone]    
Ref faxed  
no

## 2025-02-26 ENCOUNTER — TELEPHONE (OUTPATIENT)
Dept: INTERNAL MEDICINE | Facility: CLINIC | Age: 75
End: 2025-02-26

## 2025-02-26 DIAGNOSIS — Z78.0 POSTMENOPAUSE: Primary | ICD-10-CM

## 2025-02-26 NOTE — TELEPHONE ENCOUNTER
Caller: Freda Castañeda    Relationship: Self    Best call back number: [phone]     What orders are you requesting (i.e. lab or imaging): DEXA SCAN    In what timeframe would the patient need to come in: ASAP    Where will you receive your lab/imaging services: [facility]    Additional notes: PATIENT IS NEEDING ORDERS SENT TO [facility] TO GET THE DEXA SCAN SCHEDULED.     PLEASE CALL TO ADVISE WHEN THIS HAS BEEN SENT

## 2025-03-03 ENCOUNTER — TRANSCRIBE ORDERS (OUTPATIENT)
Dept: ADMINISTRATIVE | Facility: HOSPITAL | Age: 75
End: 2025-03-03
Payer: MEDICARE

## 2025-03-03 DIAGNOSIS — Z12.31 SCREENING MAMMOGRAM FOR BREAST CANCER: Primary | ICD-10-CM

## 2025-05-07 ENCOUNTER — APPOINTMENT (OUTPATIENT)
Dept: BONE DENSITY | Facility: HOSPITAL | Age: 75
End: 2025-05-07
Payer: MEDICARE

## 2025-05-07 ENCOUNTER — HOSPITAL ENCOUNTER (OUTPATIENT)
Dept: MAMMOGRAPHY | Facility: HOSPITAL | Age: 75
Discharge: HOME | End: 2025-05-07
Payer: MEDICARE

## 2025-05-07 DIAGNOSIS — Z12.31 SCREENING MAMMOGRAM FOR BREAST CANCER: ICD-10-CM

## 2025-05-07 DIAGNOSIS — Z78.0 POSTMENOPAUSE: ICD-10-CM

## 2025-05-07 PROCEDURE — 77067 SCR MAMMO BI INCL CAD: CPT | Performed by: RADIOLOGY

## 2025-05-07 PROCEDURE — 77067 SCR MAMMO BI INCL CAD: CPT

## 2025-05-07 PROCEDURE — 77063 BREAST TOMOSYNTHESIS BI: CPT

## 2025-05-07 PROCEDURE — 77080 DXA BONE DENSITY AXIAL: CPT

## 2025-05-07 PROCEDURE — 77063 BREAST TOMOSYNTHESIS BI: CPT | Performed by: RADIOLOGY

## (undated) DEVICE — Device: Brand: DEFENDO AIR/WATER/SUCTION AND BIOPSY VALVE

## (undated) DEVICE — THE BITE BLOCK MAXI, LATEX FREE STRAP IS USED TO PROTECT THE ENDOSCOPE INSERTION TUBE FROM BEING BITTEN BY THE PATIENT.

## (undated) DEVICE — THE DISPOSABLE ROTH NET FOREIGN BODY STANDARD RETRIEVAL DEVICE IS USED IN THE ENDOSCOPIC RETRIEVAL OF FOREIGN BODY, FOOD BOLUS AND EXCISED TISSUE SUCH AS POLYPS.: Brand: ROTH NET

## (undated) DEVICE — SYR LL 3CC

## (undated) DEVICE — TUBING, SUCTION, 1/4" X 10', STRAIGHT: Brand: MEDLINE

## (undated) DEVICE — SNAR POLYP CAPTIVATOR/COLD STFF RND 10MM 240CM

## (undated) DEVICE — CANNULA,ADULT,SOFT-TOUCH,7'TUBE,UC: Brand: PENDING

## (undated) DEVICE — VIAL FORMALIN CAP 10P 40ML

## (undated) DEVICE — ADAPT CLN BIOGUARD AIR/H2O DISP

## (undated) DEVICE — FRCP BX RADJAW4 NDL 2.8 240CM LG OG BX40

## (undated) DEVICE — BW-412T DISP COMBO CLEANING BRUSH: Brand: SINGLE USE COMBINATION CLEANING BRUSH

## (undated) DEVICE — THE TORRENT IRRIGATION SCOPE CONNECTOR IS USED WITH THE TORRENT IRRIGATION TUBING TO PROVIDE IRRIGATION FLUIDS SUCH AS STERILE WATER DURING GASTROINTESTINAL ENDOSCOPIC PROCEDURES WHEN USED IN CONJUNCTION WITH AN IRRIGATION PUMP (OR ELECTROSURGICAL UNIT).: Brand: TORRENT

## (undated) DEVICE — LINER SURG CANSTR SXN S/RIGD 1500CC

## (undated) DEVICE — BITEBLOCK OMNI BLOC

## (undated) DEVICE — SOL IRR H2O BTL 1000ML STRL

## (undated) DEVICE — THE SINGLE USE ETRAP – POLYP TRAP IS USED FOR SUCTION RETRIEVAL OF ENDOSCOPICALLY REMOVED POLYPS.: Brand: ETRAP

## (undated) DEVICE — KT ORCA ORCAPOD DISP STRL

## (undated) DEVICE — Device